# Patient Record
Sex: FEMALE | Race: WHITE | NOT HISPANIC OR LATINO | URBAN - METROPOLITAN AREA
[De-identification: names, ages, dates, MRNs, and addresses within clinical notes are randomized per-mention and may not be internally consistent; named-entity substitution may affect disease eponyms.]

---

## 2018-06-14 ENCOUNTER — EMERGENCY (EMERGENCY)
Facility: HOSPITAL | Age: 53
LOS: 1 days | End: 2018-06-14
Payer: COMMERCIAL

## 2018-06-14 PROCEDURE — 99284 EMERGENCY DEPT VISIT MOD MDM: CPT | Mod: 25

## 2019-10-08 ENCOUNTER — APPOINTMENT (OUTPATIENT)
Dept: MAMMOGRAPHY | Facility: CLINIC | Age: 54
End: 2019-10-08
Payer: COMMERCIAL

## 2019-10-08 PROCEDURE — 77067 SCR MAMMO BI INCL CAD: CPT

## 2019-10-08 PROCEDURE — 77063 BREAST TOMOSYNTHESIS BI: CPT

## 2019-10-17 ENCOUNTER — APPOINTMENT (OUTPATIENT)
Dept: MAMMOGRAPHY | Facility: CLINIC | Age: 54
End: 2019-10-17
Payer: COMMERCIAL

## 2019-10-17 PROCEDURE — 77065 DX MAMMO INCL CAD UNI: CPT | Mod: RT

## 2020-09-11 PROBLEM — Z00.00 ENCOUNTER FOR PREVENTIVE HEALTH EXAMINATION: Status: ACTIVE | Noted: 2020-09-11

## 2021-04-15 ENCOUNTER — APPOINTMENT (OUTPATIENT)
Dept: MAMMOGRAPHY | Facility: CLINIC | Age: 56
End: 2021-04-15
Payer: COMMERCIAL

## 2021-04-15 ENCOUNTER — APPOINTMENT (OUTPATIENT)
Dept: ULTRASOUND IMAGING | Facility: CLINIC | Age: 56
End: 2021-04-15

## 2021-04-15 PROCEDURE — 77066 DX MAMMO INCL CAD BI: CPT

## 2021-04-15 PROCEDURE — 76642 ULTRASOUND BREAST LIMITED: CPT | Mod: LT

## 2021-04-15 PROCEDURE — G0279: CPT

## 2021-04-22 ENCOUNTER — APPOINTMENT (OUTPATIENT)
Dept: ULTRASOUND IMAGING | Facility: CLINIC | Age: 56
End: 2021-04-22
Payer: COMMERCIAL

## 2021-04-22 PROCEDURE — 19083 BX BREAST 1ST LESION US IMAG: CPT | Mod: LT

## 2021-04-22 PROCEDURE — 77065 DX MAMMO INCL CAD UNI: CPT | Mod: LT

## 2021-04-27 ENCOUNTER — APPOINTMENT (OUTPATIENT)
Dept: BREAST CENTER | Facility: CLINIC | Age: 56
End: 2021-04-27
Payer: COMMERCIAL

## 2021-04-27 VITALS
SYSTOLIC BLOOD PRESSURE: 180 MMHG | HEART RATE: 67 BPM | BODY MASS INDEX: 29.02 KG/M2 | WEIGHT: 170 LBS | DIASTOLIC BLOOD PRESSURE: 110 MMHG | TEMPERATURE: 98.1 F | RESPIRATION RATE: 20 BRPM | OXYGEN SATURATION: 95 % | HEIGHT: 64 IN

## 2021-04-27 DIAGNOSIS — Z72.89 OTHER PROBLEMS RELATED TO LIFESTYLE: ICD-10-CM

## 2021-04-27 DIAGNOSIS — Z78.9 OTHER SPECIFIED HEALTH STATUS: ICD-10-CM

## 2021-04-27 PROCEDURE — 99072 ADDL SUPL MATRL&STAF TM PHE: CPT

## 2021-04-27 PROCEDURE — 99244 OFF/OP CNSLTJ NEW/EST MOD 40: CPT

## 2021-04-27 NOTE — ASSESSMENT
[FreeTextEntry1] : Pt is a 55 year old female referred for consultation by Dr. Rhodes for recent L U/S bx 4/22/21 showing IDC ER/NE/Her2 pending. Here with her , Marin. \par \par Pt states her left breast has always been lumpy but notice the left breast lump about a month ago. \par 10/8/19, Basia Crawford sMMG: TC 23.9%, R faint 6:00 calcs, rec for add imaging since prior mmg not available, BR0 ADDENDUM: outside mmg 2013 has been made avail for comparison and is of limited value, calcs not primarily observed, rec additional imaging, BR0\par 10/17/19, Yvette, ALTHEA dMMG: Benign appearing calcs, rec 6 mos f/u mmg for stability, BR3\par 4/15/21, Basia Crawford dMMG: TC 29.1%, het dense, L susp mass 12:00 kar to palpable lump, not well visualized d/t post location and adjacent implant, R negative, rec L u/s bx, BR4C\par 4/15/21, JUMANA Crawford U/S: approx 11/12:00 15mm het mass w/ irregular margins kar to palpable lump, bx rec, no abn lymph nodes in L axilla, BR4C\par 4/22/21, L U/S Bx, PATH: Invasive Ductal Carcinoma 11:00 5N, measures at least 1.5cm, -LVI, results are concordant, surg/onc mgmt rec\par \par Hx of bilat breast implants placed 2004. \par \par Fhx: Breast CA - mother 85, maunt-unknown ag at diagnosis. Not AJ.\par CBE: Right negative, Left 11-12:00 slightly palpable mass at superior edge of the implant. Bilat inframammary scars.  Implants riding high with some ptosis. No axillary or SC lymphadenopathy. \par Long discussion with patient regarding the biopsy results from [facility] from 04/22/2021  showing IDC of the L breast at the 11-12 o’clock, 10 cm FTN, Grade 2, Receptors pending, measuring 1.5 cm.  Reviewed extensively the options of mastectomy vs lumpectomy with the pros and cons for both.  \par With mastectomy, options of immediate or delayed reconstruction (expander/implant vs autologous flap) discussed and reconstruction handout given and contact for plastic surgeon. Also discussed usually no radiation with mastectomy but pending final pathology.  Discussed usually no reexcision with mastectomy. Also reviewed SLN biopsy (dye and radioactive tracer) and will check intraoperatively, if positive, may proceed with ALND. Discussed risks and complications including lymphedema and handout given out.  Also discussed if SLN is negative on frozen but positive on permanent, may need delay ALND. Reviewed will need drain(s) with mastectomy.\par \par With lumpectomy, will need radiation and treatment was reviewed.  Will need localization. Also discussed will need negative margins and if too close or positive, may need reexcision(s)/mastectomy.  JEEVAN  may not be an optimal oncoplastic surgery candidate but rec discussion with plastic surgeon. Reviewed SLNBx with patient and given Z-11, will not check axillary LN pathology intraoperatively if clinically not suspicious given radiation is to be given.  If suspicious, will check and pending results, may proceed to ALND.  \par Receptors are pending. Option of neoadj pending receptor results, if triple neg or [id number] positive.  Need for chemotherapy pending results of final pathology. May need additional tumor analysis such as oncotype Dx to determine need for chemotherapy.\par JEEVAN  was given information about reconstruction and name and contact for plastic surgeon.\par JEEVAN  met NCCN criteria for genetic testing and MYRIAD testing done today.\par She is clinical stage 1.\par She is leaning towards double mastectomy (right prophylactic), discussed is stage 1 at this time and this option is not necessary. She expressed understanding and will think about it further and see plastics. To get MRI also. \par \par

## 2021-04-27 NOTE — HISTORY OF PRESENT ILLNESS
[FreeTextEntry1] : Pt is a 55 year old female referred for consultation by Dr. Rhodes for recent L U/S bx 4/22/21 showing IDC ER/CO/Her2 pending. Here with her , Marin. \par \par Pt states her left breast has always been lumpy but notice the left breast lump about a month ago. \par 10/8/19, Basia Crawford sMMG: TC 23.9%, R faint 6:00 calcs, rec for add imaging since prior mmg not available, BR0 ADDENDUM: outside mmg 2013 has been made avail for comparison and is of limited value, calcs not primarily observed, rec additional imaging, BR0\par 10/17/19, ALTHEA Crawford dMMG: Benign appearing calcs, rec 6 mos f/u mmg for stability, BR3\par 4/15/21, Basia Crawford dMMG: TC 29.1%, het dense, L susp mass 12:00 kar to palpable lump, not well visualized d/t post location and adjacent implant, R negative, rec L u/s bx, BR4C\par 4/15/21, JUMANA Crawford U/S: approx 11/12:00 15mm het mass w/ irregular margins kar to palpable lump, bx rec, no abn lymph nodes in L axilla, BR4C\par 4/22/21, L U/S Bx, PATH: Invasive Ductal Carcinoma 11:00 5N, measures at least 1.5cm, -LVI, results are concordant, surg/onc mgmt rec\par \par Hx of bilat breast implants placed 2004. \par \par Fhx: Breast CA - mother 85, maunt-unknown ag at diagnosis. Not AJ.

## 2021-04-27 NOTE — DATA REVIEWED
[FreeTextEntry1] : 10/8/19, Basia Crawford sMMG: TC 23.9%, R faint 6:00 calcs, rec for add imaging since prior mmg not available, BR0 ADDENDUM: outside mmg 2013 has been made avail for comparison and is of limited value, calcs not primarily observed, rec additional imaging, BR0\par 10/17/19, ALTHEA Crawford dMMG: Benign appearing calcs, rec 6 mos f/u mmg for stability, BR3\par 4/15/21, Basia Crawford dMMG: TC 29.1%, het dense, L susp mass 12:00 kar to palpable lump, not well visualized d/t post location and adjacent implant, R negative, rec L u/s bx, BR4C\par 4/15/21, JUMANA Crawford U/S: approx 11/12:00 15mm het mass w/ irregular margins kar to palpable lump, bx rec, no abn lymph nodes in L axilla, BR4C\par 4/22/21, L U/S Bx, PATH: Invasive Ductal Carcinoma 11:00 5N, measures at least 1.5cm, -LVI, results are concordant, surg/onc mgmt rec

## 2021-04-27 NOTE — CONSULT LETTER
[Dear  ___] : Dear  [unfilled], [Consult Letter:] : I had the pleasure of evaluating your patient, [unfilled]. [Please see my note below.] : Please see my note below. [Consult Closing:] : Thank you very much for allowing me to participate in the care of this patient.  If you have any questions, please do not hesitate to contact me. [FreeTextEntry3] : Tegan Staley MD

## 2021-04-27 NOTE — PHYSICAL EXAM
[Normocephalic] : normocephalic [Atraumatic] : atraumatic [Supple] : supple [No Supraclavicular Adenopathy] : no supraclavicular adenopathy [No Thyromegaly] : no thyromegaly [Examined in the supine and seated position] : examined in the supine and seated position [Bra Size: ___] : Bra Size: [unfilled] [No dominant masses] : no dominant masses in right breast  [No Nipple Retraction] : no left nipple retraction [No Nipple Discharge] : no left nipple discharge [No Axillary Lymphadenopathy] : no left axillary lymphadenopathy [No Edema] : no edema [No Swelling] : no swelling [Full ROM] : full range of motion [No Rashes] : no rashes [No Ulceration] : no ulceration [de-identified] : Bilat implants, partial Subpect, riding high on breast with some ptosis. Bilat inframammary scars. [de-identified] : Slightly palpable 1.5 cm mass at superior edge of implant 11-12:00

## 2021-04-27 NOTE — PAST MEDICAL HISTORY
[Menarche Age ____] : age at menarche was [unfilled] [Menopause Age____] : age at menopause was [unfilled] [Total Preg ___] : G[unfilled] [Live Births ___] : P[unfilled]  [Age At Live Birth ___] : Age at live birth: [unfilled] [History of Hormone Replacement Treatment] : has no history of hormone replacement treatment [Living ___] : Living: [unfilled] [FreeTextEntry7] : Yes [FreeTextEntry8] : Yes, not long

## 2021-04-28 ENCOUNTER — APPOINTMENT (OUTPATIENT)
Dept: PLASTIC SURGERY | Facility: CLINIC | Age: 56
End: 2021-04-28
Payer: COMMERCIAL

## 2021-04-28 VITALS
HEIGHT: 63 IN | HEART RATE: 68 BPM | TEMPERATURE: 98.9 F | SYSTOLIC BLOOD PRESSURE: 152 MMHG | RESPIRATION RATE: 16 BRPM | BODY MASS INDEX: 30.12 KG/M2 | WEIGHT: 170 LBS | DIASTOLIC BLOOD PRESSURE: 82 MMHG | OXYGEN SATURATION: 98 %

## 2021-04-28 DIAGNOSIS — Z87.891 PERSONAL HISTORY OF NICOTINE DEPENDENCE: ICD-10-CM

## 2021-04-28 DIAGNOSIS — Z82.49 FAMILY HISTORY OF ISCHEMIC HEART DISEASE AND OTHER DISEASES OF THE CIRCULATORY SYSTEM: ICD-10-CM

## 2021-04-28 PROCEDURE — 99072 ADDL SUPL MATRL&STAF TM PHE: CPT

## 2021-04-28 PROCEDURE — 99245 OFF/OP CONSLTJ NEW/EST HI 55: CPT

## 2021-04-28 NOTE — REASON FOR VISIT
[Consultation] : a consultation visit [Spouse] : spouse [FreeTextEntry1] : consultation for reconstruction.  Dx: LT breast IDC.  Current bra size: 38C.  Here with , Marin.

## 2021-04-28 NOTE — CONSULT LETTER
[Dear  ___] : Dear  [unfilled], [Consult Letter:] : I had the pleasure of evaluating your patient, [unfilled]. [Please see my note below.] : Please see my note below. [Consult Closing:] : Thank you very much for allowing me to participate in the care of this patient.  If you have any questions, please do not hesitate to contact me. [Sincerely,] : Sincerely, [FreeTextEntry3] : Nancy Herrera MD

## 2021-04-28 NOTE — REVIEW OF SYSTEMS
[Negative] : Musculoskeletal [de-identified] : Bruising at biopsy site. [de-identified] : Upset about new cancer diagnosis.

## 2021-04-28 NOTE — PHYSICAL EXAM
[NI] : Normal [Bra Size: _______] : Bra Size: [unfilled] [de-identified] : Pt anxious and tearful. [de-identified] : NL respiratory effort noted  [de-identified] : Two parallel Pfannenstiel scars, very fine line. No palpable hernia.\par Mild to moderate excess adipose and mild excess skin. Not adequate to replace current breast plus implant volume. [de-identified] : Bilateral breasts with partial submuscular implant augmentation (likely dual plane).\par Bilateral capsular contracture and breasts with waterfall deformity.\par Right breast with additional medial deformity of IMF soft tissue and related superior tethering of the IMF scar.\par Left breast with ecchymosis at UOQ 1 o'clock.\par Mild animation deformity with increased pull of tethered scar on the right. [de-identified] : Tearful and depressed mood regarding diagnosis.

## 2021-04-28 NOTE — HISTORY OF PRESENT ILLNESS
[FreeTextEntry1] : This is a 56 yo woman with a history of bilateral breast augmentation and revision augmentation who presents now with left breast cancer. Pt reports she found a lump on the left side that seemed a little different than the lumpy tissue she reports she has always had.\par She reports her first augmentation was in 2004 and she believes the implants were saline. She then had a revision in 2006. She is not sure why the revision was done, maybe for some scar tissue, but she believes the implants were changed to gel implants.\par She is not sure at this point whether she will choose lumpectomy or mastectomy, but she does feel that she would choose to have bilateral mastectomy rather than unilateral.

## 2021-04-28 NOTE — ASSESSMENT
[FreeTextEntry1] : The options of lumpectomy vs mastectomy are being considered, and pt is here for consultation regarding breast reconstruction.\par We discussed the difficulty of achieving a single stage reconstruction regardless of whether pt has lumpectomy or mastectomy, due to the waterfall and tethering deformities of the breasts currently. She does not want to consider the options of no reconstruction or implant removal without replacement during a first stage.\par We discussed the spectrum of reconstructive options after mastectomy, including no reconstruction, implant based reconstruction and autologous reconstruction. We briefly discussed the risk/benefit ratio for each of these options.\par We discussed the R/B/A of implant based reconstruction, including the likelihood of a a two-stage reconstruction. \par She and I discussed abdominally based autologous reconstruction, but she strongly opposed the idea of "moving fat around". She was not clear why she felt this way, but the concern seems to stem from the idea that weight loss or gain in the future would distort that fat in the new location.\par She did seem more open to the prospect of fat grating than flap surgery as this would be needed with implant based reconstruction as well.\par She is awaiting results of further testing and follow up with Dr. Staley to determine whether she will pursue lumpectomy or mastectomy.\par A follow up visit here is scheduled in two weeks to finalize a plan.

## 2021-04-29 ENCOUNTER — NON-APPOINTMENT (OUTPATIENT)
Age: 56
End: 2021-04-29

## 2021-04-30 ENCOUNTER — RESULT REVIEW (OUTPATIENT)
Age: 56
End: 2021-04-30

## 2021-04-30 ENCOUNTER — NON-APPOINTMENT (OUTPATIENT)
Age: 56
End: 2021-04-30

## 2021-04-30 ENCOUNTER — APPOINTMENT (OUTPATIENT)
Dept: MRI IMAGING | Facility: CLINIC | Age: 56
End: 2021-04-30
Payer: COMMERCIAL

## 2021-04-30 PROCEDURE — A9585: CPT | Mod: JW

## 2021-04-30 PROCEDURE — A9585C: CUSTOM

## 2021-05-04 ENCOUNTER — NON-APPOINTMENT (OUTPATIENT)
Age: 56
End: 2021-05-04

## 2021-05-05 ENCOUNTER — NON-APPOINTMENT (OUTPATIENT)
Age: 56
End: 2021-05-05

## 2021-05-06 ENCOUNTER — APPOINTMENT (OUTPATIENT)
Dept: BREAST CENTER | Facility: CLINIC | Age: 56
End: 2021-05-06
Payer: COMMERCIAL

## 2021-05-06 VITALS
HEART RATE: 75 BPM | BODY MASS INDEX: 30.12 KG/M2 | SYSTOLIC BLOOD PRESSURE: 158 MMHG | TEMPERATURE: 97.3 F | HEIGHT: 63 IN | WEIGHT: 170 LBS | DIASTOLIC BLOOD PRESSURE: 90 MMHG

## 2021-05-06 PROCEDURE — 99215 OFFICE O/P EST HI 40 MIN: CPT

## 2021-05-06 PROCEDURE — 99072 ADDL SUPL MATRL&STAF TM PHE: CPT

## 2021-05-06 NOTE — PAST MEDICAL HISTORY
[Menarche Age ____] : age at menarche was [unfilled] [Menopause Age____] : age at menopause was [unfilled] [Total Preg ___] : G[unfilled] [Live Births ___] : P[unfilled]  [Living ___] : Living: [unfilled] [Age At Live Birth ___] : Age at live birth: [unfilled] [History of Hormone Replacement Treatment] : has no history of hormone replacement treatment [FreeTextEntry7] : Yes [FreeTextEntry8] : Yes, not long

## 2021-05-06 NOTE — PHYSICAL EXAM
[Normocephalic] : normocephalic [Atraumatic] : atraumatic [Supple] : supple [No Supraclavicular Adenopathy] : no supraclavicular adenopathy [No Thyromegaly] : no thyromegaly [Examined in the supine and seated position] : examined in the supine and seated position [Bra Size: ___] : Bra Size: [unfilled] [No dominant masses] : no dominant masses in right breast  [No dominant masses] : no dominant masses left breast [No Nipple Retraction] : no left nipple retraction [No Nipple Discharge] : no left nipple discharge [No Axillary Lymphadenopathy] : no left axillary lymphadenopathy [No Edema] : no edema [No Swelling] : no swelling [Full ROM] : full range of motion [No Rashes] : no rashes [No Ulceration] : no ulceration [de-identified] : Bilat implants, partial Subpect, riding high on breast with some ptosis. Bilat inframammary scars.

## 2021-05-06 NOTE — HISTORY OF PRESENT ILLNESS
[FreeTextEntry1] : Pt is a 55 year old female here for follow up regarding further treatment planning and review of MRI for recent L U/S bx 4/22/21 showing IDC, ER/AK status + >95%, Her2 equivocal, CISH negative.\par referred by Dr. Rhodes \par Here with her , Marin. \par \par Pt states her left breast has always been lumpy but notice the left breast lump about a month ago. \par 10/8/19, Basia Crawford sMMG: TC 23.9%, R faint 6:00 calcs, rec for add imaging since prior mmg not available, BR0 ADDENDUM: outside mmg 2013 has been made avail for comparison and is of limited value, calcs not primarily observed, rec additional imaging, BR0\par 10/17/19, ALTHEA Crawford dMMG: Benign appearing calcs, rec 6 mos f/u mmg for stability, BR3\par 4/15/21, Basia Crawford dMMG: TC 29.1%, het dense, L susp mass 12:00 kar to palpable lump, not well visualized d/t post location and adjacent implant, R negative, rec L u/s bx, BR4C\par 4/15/21, JUMANA Crawford U/S: approx 11/12:00 15mm het mass w/ irregular margins kar to palpable lump, bx rec, no abn lymph nodes in L axilla, BR4C\par 4/22/21, L U/S Bx, PATH: Invasive Ductal Carcinoma 11:00 5N, measures at least 1.5cm, -LVI, results are concordant, surg/onc mgmt rec\par 4/30/21, [facility], MRI: R scattered nonspecific enhancing foci, implant intact, L scattered nonspecific enhancing foci, irregularly enhancing mass 11/12:00 position w/ assoc tissue marker, rep known malignancy, 4.2cm sup to inferior, 3.7cm ant to post, close proximity to pectoralis muscle w/o evidence of chest wall invasion, enhancement extends either to the ant margin of the pec muscle or perhaps within 1-2mm of the muscle, 3.1cm left to right dimension, more inferiorly lateral well-circumscribed T2 hyperintense mass w/ a nonenhancing septation likely rep FA approx 10mm not sig changed from mmg from 2013, asymmetry of implant when comp to R with fallaway sign rep intracapsular rupture, no sig axillary or internal mammary lymphadenopathy, no evidence of multifocal or multicentric dx, rec surg/onc mgmt, BR6.\par \par Hx of bilat breast implants placed 2004. \par \par Fhx: Breast CA - mother 85, maunt-unknown ag at diagnosis. Not AJ. \par 4/27/21 CBE: Right neg, Left 1.5 cm mass. No adenopathy. \par \par

## 2021-05-06 NOTE — ASSESSMENT
[FreeTextEntry1] : Pt is a 55 year old female here for follow up regarding further treatment planning and review of MRI for recent L U/S bx 4/22/21 showing IDC, ER/OK status + >95%, Her2 equivocal, CISH negative.\par referred by Dr. Rhdoes \par Here with her , Marin. \par PCP is Bethany Wright MD.\par Pt states her left breast has always been lumpy but notice the left breast lump about a month ago. \par 10/8/19, Basia Crawford sMMG: TC 23.9%, R faint 6:00 calcs, rec for add imaging since prior mmg not available, BR0 ADDENDUM: outside mmg 2013 has been made avail for comparison and is of limited value, calcs not primarily observed, rec additional imaging, BR0\par 10/17/19, ALTHEA Crawford dMMG: Benign appearing calcs, rec 6 mos f/u mmg for stability, BR3\par 4/15/21, Basia Crawford dMMG: TC 29.1%, het dense, L susp mass 12:00 kar to palpable lump, not well visualized d/t post location and adjacent implant, R negative, rec L u/s bx, BR4C\par 4/15/21, JUMANA Crawford U/S: approx 11/12:00 15mm het mass w/ irregular margins kar to palpable lump, bx rec, no abn lymph nodes in L axilla, BR4C\par 4/22/21, L U/S Bx, PATH: Invasive Ductal Carcinoma 11:00 5N, measures at least 1.5cm, -LVI, results are concordant, surg/onc mgmt rec\par 4/30/21, [facility], MRI: R scattered nonspecific enhancing foci, implant intact, L scattered nonspecific enhancing foci, irregularly enhancing mass 11/12:00 position w/ assoc tissue marker, rep known malignancy, 4.2cm sup to inferior, 3.7cm ant to post, close proximity to pectoralis muscle w/o evidence of chest wall invasion, enhancement extends either to the ant margin of the pec muscle or perhaps within 1-2mm of the muscle, 3.1cm left to right dimension, more inferiorly lateral well-circumscribed T2 hyperintense mass w/ a nonenhancing septation likely rep FA approx 10mm not sig changed from mmg from 2013, asymmetry of implant when comp to R with fallaway sign rep intracapsular rupture, no sig axillary or internal mammary lymphadenopathy, no evidence of multifocal or multicentric dx, rec surg/onc mgmt, BR6.\par \par Hx of bilat breast implants placed 2004. \par \par Fhx: Breast CA - mother 85, maunt-unknown ag at diagnosis. Not AJ. \par 4/27/21 CBE: Right neg, Left 1.5 cm mass. No adenopathy. \par Pt was leaning towards mastectomy at last visit, genetic test results still pending. She would need revision on the right for symmetry.  \par Pt saw Dr. Herrera and has appt with her on weds.  Reviewed studies and results of MRI, lesion on MRI is larger than on mmg and CBE.  4.2 cm Superior/Inferior to 3.7 cm AP, 3.1 cm CC. No evidence of adenopathy. RIGHT negative, Also intracapsular rupture of left implant.\par If pt wants lumpectomy, could biopsy the extreme end of abnormality to see if still a candidate for BCS. Also neoadj option discussed but tumor is ER/OK 95% and [id number] neg. Would do oncotype prior to initiation of chemo. \par  On CBE and mmg and u/s, it appears to be 1.5 cm and discussed oversensitivity of MRI. \par  Pt wondering about need for chemo, discussed will be dependent on LN positivity and/or oncotype.  Stressed the the type of surgery is independent of need for chemo.  Radiation with mastectomy unlikely but dependent on size, margins and/or LN status.\par Pt wondering about Right prophylactic. Discussed no evidence to support prolongation of life with it, risk reduction is not 100% with mastectomies.  Would be done for symmetry, pt's choice.  Also would not have to wait for results of genetic testing if she has double mastectomy.  Would not do SLBbx on right if prophylactic but if any malignancy found incidentally on right, may need additional axillary surgery or treatment.  Also rediscussed SLBbx on left and possible ALND. Risk of lymphedema also reviewed. \par Reviewed recovery, drains, postop course.  \par Pt declines neoadj option or rebiopsy would like to schedule double mastectomy at this time with implant/expander recon for 5/21/21.  She does not want MEENU. \par

## 2021-05-07 ENCOUNTER — NON-APPOINTMENT (OUTPATIENT)
Age: 56
End: 2021-05-07

## 2021-05-12 ENCOUNTER — NON-APPOINTMENT (OUTPATIENT)
Age: 56
End: 2021-05-12

## 2021-05-12 ENCOUNTER — APPOINTMENT (OUTPATIENT)
Dept: PLASTIC SURGERY | Facility: CLINIC | Age: 56
End: 2021-05-12
Payer: COMMERCIAL

## 2021-05-12 VITALS
HEART RATE: 80 BPM | RESPIRATION RATE: 16 BRPM | BODY MASS INDEX: 29.95 KG/M2 | OXYGEN SATURATION: 98 % | HEIGHT: 63 IN | DIASTOLIC BLOOD PRESSURE: 84 MMHG | WEIGHT: 169 LBS | SYSTOLIC BLOOD PRESSURE: 152 MMHG | TEMPERATURE: 98.8 F

## 2021-05-12 DIAGNOSIS — Z42.1 ENCOUNTER FOR BREAST RECONSTRUCTION FOLLOWING MASTECTOMY: ICD-10-CM

## 2021-05-12 DIAGNOSIS — T85.42XD: ICD-10-CM

## 2021-05-12 DIAGNOSIS — Z98.82 BREAST IMPLANT STATUS: ICD-10-CM

## 2021-05-12 PROCEDURE — 99072 ADDL SUPL MATRL&STAF TM PHE: CPT

## 2021-05-12 PROCEDURE — 99215 OFFICE O/P EST HI 40 MIN: CPT

## 2021-05-12 PROCEDURE — 99417 PROLNG OP E/M EACH 15 MIN: CPT

## 2021-05-12 NOTE — PHYSICAL EXAM
[NI] : Normal [de-identified] : occ tearful. Alert and oriented x 3. [de-identified] : NL respiratory effort noted  [de-identified] : Bilateral breast examined.\par No change from previous.

## 2021-05-12 NOTE — HISTORY OF PRESENT ILLNESS
[FreeTextEntry1] : Pt returns to discuss breast reconstruction options, now that she has more results of her testing.\par She has spoken with Dr. Staley and has decided on left mastectomy, and is leaning toward right prophylactic mastectomy as well.\par She has multiple questions about the reconstruction process.

## 2021-05-12 NOTE — REASON FOR VISIT
[Follow-Up: _____] : a [unfilled] follow-up visit [FreeTextEntry1] : discuss reconstruction options.  Recent dx of LT breast IDC

## 2021-05-12 NOTE — ASSESSMENT
[FreeTextEntry1] : Following our discussion, pt confirms that she wishes to have bilateral mastectomies and implant based breast reconstruction.\par We reviewed the spectrum of reconstructive options, including no reconstruction, implant based reconstruction and autologous reconstruction. We discussed the risk/benefit ratio for each of these options.\par She and I discussed at length the R/B/A of implant based reconstruction, including the need for a two-stage reconstruction, including bilateral capsulectomies and implant removal. We discussed the process of tissue expander fills over time and that the Douglas would be filled in the OR to the limits guided by the overlying skin viability.\par We also reviewed the potential for skin flap necrosis necessitating revision or TE removal.  We discussed that implants are not considered lifetime devices, and may need to be replaced in the future. We discussed the risks of infection requiring implant removal, rupture, capsular contracture and implant exposure (especially following radiation).\par We reviewed drain care and she was able to return demonstrate the technique times two.\par We also reviewed pain management. She was worried about pain and emphasized the need for a narcotic prescription. I went over the effectiveness of the non-narcotic regimen, but that oxycodone would also be given for breakthrough. She is tentatively scheduled for 5/21/21. Will discuss with Dr. Staley.

## 2021-05-13 PROBLEM — T85.42XD: Status: ACTIVE | Noted: 2021-05-13

## 2021-05-13 PROBLEM — Z98.82 BREAST IMPLANT STATUS: Status: ACTIVE | Noted: 2021-05-13

## 2021-05-14 ENCOUNTER — OUTPATIENT (OUTPATIENT)
Dept: OUTPATIENT SERVICES | Facility: HOSPITAL | Age: 56
LOS: 1 days | End: 2021-05-14
Payer: COMMERCIAL

## 2021-05-14 PROCEDURE — 93010 ELECTROCARDIOGRAM REPORT: CPT

## 2021-05-14 PROCEDURE — 71046 X-RAY EXAM CHEST 2 VIEWS: CPT | Mod: 26

## 2021-05-17 ENCOUNTER — NON-APPOINTMENT (OUTPATIENT)
Age: 56
End: 2021-05-17

## 2021-05-18 ENCOUNTER — APPOINTMENT (OUTPATIENT)
Dept: DISASTER EMERGENCY | Facility: CLINIC | Age: 56
End: 2021-05-18

## 2021-05-18 ENCOUNTER — OUTPATIENT (OUTPATIENT)
Dept: OUTPATIENT SERVICES | Facility: HOSPITAL | Age: 56
LOS: 1 days | End: 2021-05-18

## 2021-05-19 LAB — SARS-COV-2 N GENE NPH QL NAA+PROBE: NOT DETECTED

## 2021-05-21 ENCOUNTER — INPATIENT (INPATIENT)
Facility: HOSPITAL | Age: 56
LOS: 0 days | Discharge: HOME | End: 2021-05-22
Attending: SURGERY | Admitting: SURGERY
Payer: COMMERCIAL

## 2021-05-21 ENCOUNTER — APPOINTMENT (OUTPATIENT)
Dept: PLASTIC SURGERY | Facility: HOSPITAL | Age: 56
End: 2021-05-21
Payer: COMMERCIAL

## 2021-05-21 ENCOUNTER — APPOINTMENT (OUTPATIENT)
Dept: BREAST CENTER | Facility: HOSPITAL | Age: 56
End: 2021-05-21

## 2021-05-21 PROCEDURE — 38792 RA TRACER ID OF SENTINL NODE: CPT | Mod: 59

## 2021-05-21 PROCEDURE — 38525 BIOPSY/REMOVAL LYMPH NODES: CPT

## 2021-05-21 PROCEDURE — 19371 PERI-IMPLT CAPSLC BRST COMPL: CPT | Mod: RT

## 2021-05-21 PROCEDURE — 19357 TISS XPNDR PLMT BRST RCNSTJ: CPT | Mod: RT

## 2021-05-21 PROCEDURE — 19303 MAST SIMPLE COMPLETE: CPT | Mod: 50

## 2021-05-21 PROCEDURE — 15777 ACELLULAR DERM MATRIX IMPLT: CPT | Mod: LT

## 2021-05-21 PROCEDURE — 38900 IO MAP OF SENT LYMPH NODE: CPT

## 2021-05-23 ENCOUNTER — NON-APPOINTMENT (OUTPATIENT)
Age: 56
End: 2021-05-23

## 2021-05-24 ENCOUNTER — NON-APPOINTMENT (OUTPATIENT)
Age: 56
End: 2021-05-24

## 2021-05-26 ENCOUNTER — APPOINTMENT (OUTPATIENT)
Dept: PLASTIC SURGERY | Facility: CLINIC | Age: 56
End: 2021-05-26
Payer: COMMERCIAL

## 2021-05-26 VITALS
RESPIRATION RATE: 16 BRPM | OXYGEN SATURATION: 99 % | SYSTOLIC BLOOD PRESSURE: 120 MMHG | WEIGHT: 166 LBS | DIASTOLIC BLOOD PRESSURE: 80 MMHG | BODY MASS INDEX: 29.41 KG/M2 | HEART RATE: 76 BPM | HEIGHT: 63 IN

## 2021-05-26 PROCEDURE — 99024 POSTOP FOLLOW-UP VISIT: CPT

## 2021-05-26 NOTE — REASON FOR VISIT
[Post Op: _________] : a [unfilled] post op visit [FreeTextEntry1] : pt had mastectomy done, is following up on how she is doing \par states she has no complaints

## 2021-05-26 NOTE — PHYSICAL EXAM
[de-identified] : NL respiratory effort noted\par  [de-identified] : bilateral Douglas in good position.\par Right chest skin with some ischemic skin in the IMF at the medial corner, but this has cap refill.\par Left chest wall skin with some isosulfan blue remaining in the inferior aspect, medial and lateral to the T junction, but much less than POD#1 in the hospital. The total area of concern is about 3cm in diameter.\par Hyperemia noted on both sides, but limited to the areas of DMSO application.\par drain dressings changed.\par Drains serosanguinous.

## 2021-05-26 NOTE — HISTORY OF PRESENT ILLNESS
[FreeTextEntry1] : Pt reports she is not in much pain, just finding it hard to look at her chest following the mastectomies.\par Also asking when she can start upper body workouts/weights again.\par  is applying DMSO gel as instructed.\par Drains 80 on the left and 45 on the right as of yesterday. Total amounts are coming down.

## 2021-05-26 NOTE — ASSESSMENT
[FreeTextEntry1] : Stable postop day 5.\par DMSO is improving the circulation to the ischemic mastectomy flaps.  instructed to apply it to a smaller area now on each side.\par Follow up weekly for drain removal (and TE fill once skin is ready).

## 2021-05-28 ENCOUNTER — NON-APPOINTMENT (OUTPATIENT)
Age: 56
End: 2021-05-28

## 2021-06-01 ENCOUNTER — NON-APPOINTMENT (OUTPATIENT)
Age: 56
End: 2021-06-01

## 2021-06-02 ENCOUNTER — APPOINTMENT (OUTPATIENT)
Dept: PLASTIC SURGERY | Facility: CLINIC | Age: 56
End: 2021-06-02
Payer: COMMERCIAL

## 2021-06-02 VITALS
BODY MASS INDEX: 28.1 KG/M2 | HEIGHT: 63 IN | OXYGEN SATURATION: 98 % | TEMPERATURE: 98.3 F | SYSTOLIC BLOOD PRESSURE: 152 MMHG | HEART RATE: 71 BPM | WEIGHT: 158.6 LBS | RESPIRATION RATE: 16 BRPM | DIASTOLIC BLOOD PRESSURE: 86 MMHG

## 2021-06-02 PROCEDURE — 99024 POSTOP FOLLOW-UP VISIT: CPT

## 2021-06-02 NOTE — REASON FOR VISIT
[Post Op: _________] : a [unfilled] post op visit [Spouse] : spouse [FreeTextEntry1] : s/p B/L breast capsulectomy with implant removal / immediate B/L reconstruction with TE implantation done 5/21/21.  Accompanied by , Marin.

## 2021-06-02 NOTE — ASSESSMENT
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 She was also unsure whether this meant the cancer had spread. I also reassured her that this was not the case and that she also had negative sentinel lymph node biopsies that indicate that the tumor had not spread to the lymph nodes. She stated she felt relieved after this explanation. \par Follow up in one week for left drain removal and wound check.

## 2021-06-02 NOTE — SURGICAL HISTORY
[de-identified] : 05/21/2021 - B/L breast capsulectomy with implant removal ;  immediate B/L reconstruction with TE done at [facility] [place] by [place]. Nancy Herrera

## 2021-06-02 NOTE — PHYSICAL EXAM
[de-identified] : Anxious, tearful [de-identified] : Right chest wall healing well. TE in good position. Steristrips in place. Good cap refill throughout.\par Left chest wall: blue dye has cleared. There is a 1.5cm area of tissue necrosis in the inferior aspect on the medial limb. There is a smaller area of ischemia on the corner of the lateral limb. TE is in place, but fluid is tending to collect in the superior pole of the TE rather than inferior.\par No erythema or induration.\par Right drain removed without difficulty and pt tolerated it well.\par Left drain stripped. Contents are serosanguinous.

## 2021-06-02 NOTE — HISTORY OF PRESENT ILLNESS
[FreeTextEntry1] : Pt reports she is feeling fine physically, but upset because she said she "still has cancer" in her.\par She reports she was informed of the positive anterior margin on pathology and that she will need more treatment pending oncotype and RT/onc consultations.\par She is also scared that one of the drains may be removed and "that's going to hurt". When asked why she thought that, she referred to YouTube videos "that I probably shouldn't be watching".\par Right drain about 30ml /day for several days.\par Left drain 45-50/day.\par \par She is here with her .\par

## 2021-06-03 ENCOUNTER — NON-APPOINTMENT (OUTPATIENT)
Age: 56
End: 2021-06-03

## 2021-06-03 ENCOUNTER — APPOINTMENT (OUTPATIENT)
Dept: BREAST CENTER | Facility: CLINIC | Age: 56
End: 2021-06-03
Payer: COMMERCIAL

## 2021-06-03 VITALS
BODY MASS INDEX: 28 KG/M2 | WEIGHT: 158 LBS | HEART RATE: 72 BPM | HEIGHT: 63 IN | TEMPERATURE: 97.3 F | DIASTOLIC BLOOD PRESSURE: 85 MMHG | SYSTOLIC BLOOD PRESSURE: 147 MMHG

## 2021-06-03 PROCEDURE — 99024 POSTOP FOLLOW-UP VISIT: CPT

## 2021-06-03 NOTE — HISTORY OF PRESENT ILLNESS
[FreeTextEntry1] : Pt is a 55 year old female here post op 5/21/21 R prophylactic and L mastectomy w/ SLNbx and bilat recon w/ TE per Dr. Herrera for recent L U/S bx 4/22/21 showing IDC, ER/MA status + >95%, Her2, CISH negative.\par \par 5/21/21 Surgical PATH: R negative, L IDC mod diff, anterior margin + invasive ca, posterior margin 1.0mm from invasive ca, pT2N0/1Mx, measures 2.5x1.8x1.5cm, Grade 2, -DCIS, -LCIS, -LVI, focal ALH, ER+ >95%, MA+ >95%, Her2 CISH-\par Referred by Dr. Rhodes \par Here with her , Mairn. \par PCP is Bethany Wright MD.\par \par Hx of bilat breast implants placed 2004. \par \par Fhx: Breast CA - mother 85, maunt-unknown ag at diagnosis. Not AJ. \par 4/27/21 CBE: Right neg, Left 1.5 cm mass. No adenopathy. \par Pt doing ok physically but emotionally upset. Has been reading a lot on the internet and wondering if it has spread to her SC LN.

## 2021-06-03 NOTE — DATA REVIEWED
[FreeTextEntry1] : 5/21/21 Surgical PATH: R negative, L IDC mod diff, anterior margin + invasive ca, posterior margin 1.0mm from invasive ca, pT2N0/1Mx, measures 2.5x1.8x1.5cm, Grade 2, -DCIS, -LCIS, LVI - scattered foci susp for lymphovascular invasion, focal ALH, FCC w/ apocrine metaplasia, ER+ >95%, NE+ >95%, [id number]+, CISH-

## 2021-06-03 NOTE — PHYSICAL EXAM
[No Supraclavicular Adenopathy] : no supraclavicular adenopathy [No Axillary Lymphadenopathy] : no left axillary lymphadenopathy [No Edema] : no edema [No Swelling] : no swelling [Full ROM] : full range of motion [No Rashes] : no rashes [No Ulceration] : no ulceration [de-identified] : S/p mastectomy bilat, Bilat inverted T incisions w/ steri strips; R breast drain taken out 6/1 by JUMANA Herrera breast drain still in place draining serosanguinous fluid. No evidence of hematoma or cellulitis.  NO left supraclavicular lymphadenopathy.

## 2021-06-03 NOTE — ASSESSMENT
[FreeTextEntry1] : Pt is a 55 year old female here post op 5/21/21 R prophylactic and L mastectomy w/ SLNbx and bilat recon w/ TE per Dr. Herrera for recent L U/S bx 4/22/21 showing IDC, ER/OK status + >95%, Her2 equivocal, CISH negative.\par \par 5/21/21 Surgical PATH: R negative, L IDC mod diff, anterior margin + invasive ca, posterior margin 1.0mm from invasive ca, pT2N0/1Mx, measures 2.5x1.8x1.5cm, Grade 2, -DCIS, -LCIS, - LVI, focal ALH, FCC w/ apocrine metaplasia, ER+ >95%, OK+ >95%, Her2 CISH-\par Referred by Dr. Rhodes \par Here with her , Marin. \par PCP is Bethany Wright MD.\par \par Hx of bilat breast implants placed 2004. \par \par Fhx: Breast CA - mother 85, maunt-unknown ag at diagnosis. Not AJ. \par 4/27/21 CBE: Right neg, Left 1.5 cm mass. No adenopathy. \par CBE: Inverted T inc after bilat mastectomy, with SADE in left. Healing well. No left SC lymphadenopathy. Full ROM and no LE.\par Reviewed path with pt and , oncotype pending . Positive anterior margins, may need radiation. LN negative. Pt reassured no evidence of SC lymphadenopathy. Rec appt with rad onc and also med onc (once oncotype is back). Advised to stop reading on the internet as it is causing her a lot distress.\par F.u 4 weeks. Appt with Dr. Herrera as planned. \par

## 2021-06-03 NOTE — CONSULT LETTER
[Dear  ___] : Dear  [unfilled], [Courtesy Letter:] : I had the pleasure of seeing your patient, [unfilled], in my office today. [Please see my note below.] : Please see my note below. [Sincerely,] : Sincerely, [FreeTextEntry3] : Tegan Staley MD

## 2021-06-09 ENCOUNTER — APPOINTMENT (OUTPATIENT)
Dept: PLASTIC SURGERY | Facility: CLINIC | Age: 56
End: 2021-06-09
Payer: COMMERCIAL

## 2021-06-09 VITALS
HEART RATE: 76 BPM | BODY MASS INDEX: 27.82 KG/M2 | HEIGHT: 63 IN | OXYGEN SATURATION: 98 % | SYSTOLIC BLOOD PRESSURE: 142 MMHG | DIASTOLIC BLOOD PRESSURE: 84 MMHG | RESPIRATION RATE: 16 BRPM | TEMPERATURE: 98 F | WEIGHT: 157 LBS

## 2021-06-09 DIAGNOSIS — S21.102A UNSPECIFIED OPEN WOUND OF LEFT FRONT WALL OF THORAX W/OUT PENETRATION INTO THORACIC CAVITY, INITIAL ENCOUNTER: ICD-10-CM

## 2021-06-09 PROCEDURE — 99024 POSTOP FOLLOW-UP VISIT: CPT

## 2021-06-09 RX ORDER — MULTIVIT-MIN/VIT C/HERB NO.124 250-11.66
TABLET,CHEWABLE ORAL
Refills: 0 | Status: DISCONTINUED | COMMUNITY
End: 2021-06-09

## 2021-06-09 NOTE — HISTORY OF PRESENT ILLNESS
[FreeTextEntry1] : Pt reports the drain output has been 35-35-30 over the last three days.\par the drain site is really irritating her.\par Otherwise feeling ok.\par

## 2021-06-09 NOTE — PHYSICAL EXAM
[NI] : Normal [de-identified] : NL respiratory effort noted  [de-identified] : Bilateral Douglas in good position.\par All steristrips removed.\par Right side is healing well.\par Left drain removed with out problem (serous fluid).\par Left inferior pole skin necrosis excised with scissors as the edges were starting to separate.\par Healthy, viable autoderm flap visible at base. Some necrotic SQ fat seen at the periphery of the wound.\par No erythema, edema or induration.\par The the demarcated area was about 1.5 cm in diameter.

## 2021-06-09 NOTE — ASSESSMENT
[FreeTextEntry1] : Pt stable postop.\par Left mastectomy flap necrosis without evidence of expander infection.\par Will start local wound care and monitor weekly. Instructed pt on abx ointment on a gauze pad daily or more often as needed, tuck into bra ( camisole with shelf bra etc) on the left.\par Will discuss wound and expansion schedule with Dr. Kelley of radiation oncology.\par follow up one week.

## 2021-06-09 NOTE — REASON FOR VISIT
[Post Op: _________] : a [unfilled] post op visit [FreeTextEntry1] : s/p B/L breast capsulectomy with impant removal / immediate B/L reconstruction with TE implantation done 5/21/21.  Patient states she is feeling well today.

## 2021-06-12 ENCOUNTER — NON-APPOINTMENT (OUTPATIENT)
Age: 56
End: 2021-06-12

## 2021-06-16 ENCOUNTER — APPOINTMENT (OUTPATIENT)
Dept: PLASTIC SURGERY | Facility: CLINIC | Age: 56
End: 2021-06-16
Payer: COMMERCIAL

## 2021-06-16 VITALS
RESPIRATION RATE: 16 BRPM | HEART RATE: 64 BPM | OXYGEN SATURATION: 97 % | DIASTOLIC BLOOD PRESSURE: 60 MMHG | SYSTOLIC BLOOD PRESSURE: 100 MMHG | BODY MASS INDEX: 28.03 KG/M2 | WEIGHT: 158.2 LBS | HEIGHT: 63 IN | TEMPERATURE: 98.4 F

## 2021-06-16 DIAGNOSIS — S21.102D: ICD-10-CM

## 2021-06-16 PROCEDURE — 99024 POSTOP FOLLOW-UP VISIT: CPT

## 2021-06-16 NOTE — REASON FOR VISIT
[Post Op: _________] : a [unfilled] post op visit [FreeTextEntry1] : s/p B/L breast capsulectomy with implant removal /  immediate B/L reconstruction with TE implantation done 5/21/21.

## 2021-06-16 NOTE — PHYSICAL EXAM
[NI] : Normal [de-identified] : NL respiratory effort noted  [de-identified] : bilateral reconstructed breasts with decreased edema, softer.\par Left chest wound clean with granulating base.\par There is some necrotic fat around the edges that was removed bluntly.\par No erythema. No excess drainage (just the area of the open wound).

## 2021-06-16 NOTE — HISTORY OF PRESENT ILLNESS
[FreeTextEntry1] : Pt reports no significant changes.\par She did run out of xeroform and also notes the xeroform smells terrible to her.

## 2021-06-16 NOTE — ASSESSMENT
[FreeTextEntry1] : Improving s/p bilateral mastectomy and TE placement, but with an open wound at the base on the left.\par Will hold off RT for now until this is healing a bit more.\par Pt may hold off using xeroform and start antibiotic ointment on gauze or ABD pad.\par Follow up weekly.

## 2021-06-18 ENCOUNTER — OUTPATIENT (OUTPATIENT)
Dept: OUTPATIENT SERVICES | Facility: HOSPITAL | Age: 56
LOS: 1 days | End: 2021-06-18

## 2021-06-18 DIAGNOSIS — C50.919 MALIGNANT NEOPLASM OF UNSPECIFIED SITE OF UNSPECIFIED FEMALE BREAST: ICD-10-CM

## 2021-06-21 ENCOUNTER — APPOINTMENT (OUTPATIENT)
Dept: HEMATOLOGY ONCOLOGY | Facility: CLINIC | Age: 56
End: 2021-06-21
Payer: COMMERCIAL

## 2021-06-21 ENCOUNTER — RESULT REVIEW (OUTPATIENT)
Age: 56
End: 2021-06-21

## 2021-06-21 VITALS
DIASTOLIC BLOOD PRESSURE: 91 MMHG | BODY MASS INDEX: 27.46 KG/M2 | OXYGEN SATURATION: 98 % | SYSTOLIC BLOOD PRESSURE: 154 MMHG | HEIGHT: 63 IN | WEIGHT: 155 LBS | TEMPERATURE: 98.1 F | HEART RATE: 70 BPM

## 2021-06-21 PROCEDURE — 99072 ADDL SUPL MATRL&STAF TM PHE: CPT

## 2021-06-21 PROCEDURE — 99205 OFFICE O/P NEW HI 60 MIN: CPT

## 2021-06-21 NOTE — REVIEW OF SYSTEMS
[Patient Intake Form Reviewed] : Patient intake form was reviewed [Joint Pain] : joint pain [Joint Stiffness] : joint stiffness [Skin Wound] : skin wound [Fever] : no fever [Dysphagia] : no dysphagia [Odynophagia] : no odynophagia [Chest Pain] : no chest pain [Shortness Of Breath] : no shortness of breath [Abdominal Pain] : no abdominal pain [Anxiety] : no anxiety [Depression] : no depression [Easy Bleeding] : no tendency for easy bleeding [Easy Bruising] : no tendency for easy bruising [Swollen Glands] : no swollen glands [de-identified] : healing breast wound

## 2021-06-21 NOTE — CONSULT LETTER
[Dear  ___] : Dear  [unfilled], [Consult Letter:] : I had the pleasure of evaluating your patient, [unfilled]. [Consult Closing:] : Thank you very much for allowing me to participate in the care of this patient.  If you have any questions, please do not hesitate to contact me. [Please see my note below.] : Please see my note below. [Sincerely,] : Sincerely, [DrShea  ___] : Dr. FISCHER [DrShea ___] : Dr. FISCHER [FreeTextEntry2] : Dr. Tegan Staley [FreeTextEntry3] : Catalino Kellogg MD\par

## 2021-06-21 NOTE — RESULTS/DATA
[FreeTextEntry1] : Ms. Che is a pleasant 55 year-old woman with recently diagnosed [id number] ER/DC+, Her2- left breast cancer status-post bilateral mastectomy and sentinel lymph node biopsy, here to discuss adjuvant treatments.

## 2021-06-21 NOTE — HISTORY OF PRESENT ILLNESS
[de-identified] : Ms. Che has recently been diagnosed with left breast ER/IN+, Her2 negative invasive ductal carcinoma on ultrasound-guided biopsy, which was completed on April 22, 2021.  Core biopsy was ER/IN+, Her2 negative by [facility]. \par \par Emily underwent bilateral mastectomy (right prophylactic) with sentinel node biopsy and reconstruction with tissue expanders on May, 21, 2021. Final surgical pathology revealed left invasive ductal carcinoma (Grade 2), ER/IN+, Her2-, measuring 2.5 x 1.8 x 1.5cm. The anterior margin was positive. [place] lymph node was negative. There was no evidence of DCIS or LCIS. [id number].\par \par Her tissue expanders are currently in place. Some delayed healing on the left, which has delayed adjuvant treatments. Oncotype testing revealed a recurrence score of 11.  She is post-menopausal. Myriad testing was negative.\par

## 2021-06-23 ENCOUNTER — APPOINTMENT (OUTPATIENT)
Dept: PLASTIC SURGERY | Facility: CLINIC | Age: 56
End: 2021-06-23
Payer: COMMERCIAL

## 2021-06-23 VITALS
BODY MASS INDEX: 28.07 KG/M2 | RESPIRATION RATE: 16 BRPM | TEMPERATURE: 98.3 F | HEIGHT: 63 IN | SYSTOLIC BLOOD PRESSURE: 142 MMHG | DIASTOLIC BLOOD PRESSURE: 84 MMHG | WEIGHT: 158.4 LBS | HEART RATE: 66 BPM | OXYGEN SATURATION: 98 %

## 2021-06-23 LAB — SURGICAL PATHOLOGY STUDY: SIGNIFICANT CHANGE UP

## 2021-06-23 PROCEDURE — 99024 POSTOP FOLLOW-UP VISIT: CPT

## 2021-06-23 NOTE — PHYSICAL EXAM
[NI] : Normal [de-identified] : NL respiratory effort noted  [de-identified] : bilateral Douglas in place, in good position\par The left side seems to have settled into a better position.\par No erythema.\par Left sided wound is about the same size at the level of the skin, approx 1.5x2cm, but with increased\par granulation at the base, and less undermining. No residual fat necrosis. No purulence.\par Right side well healed.

## 2021-06-23 NOTE — ASSESSMENT
[FreeTextEntry1] : Doing well.\par Slowly healing left chest wound without evidence of TE infection.\par Not yet ready for expansion or radiation.

## 2021-06-23 NOTE — REASON FOR VISIT
[Post Op: _________] : a [unfilled] post op visit [FreeTextEntry1] : s/p B/L breast capsulectomy with implant removal / immediate B/L reconstruction with TE implantation done 5/21/21.  Patient c/o pain under LT breast with certain movements.  Also reports she did not take Clindamycin.

## 2021-06-23 NOTE — HISTORY OF PRESENT ILLNESS
[FreeTextEntry1] : Pt reports that she did not end up taking the antibiotics.\par Not due to any particular concern, just felt like "living dangerously" as she didn't feel she had any infection.\par Feeling ok otherwise. Some minor pain along the left chest wall after doing some situps.\par Anxious to resume working out more.

## 2021-06-29 ENCOUNTER — APPOINTMENT (OUTPATIENT)
Dept: PLASTIC SURGERY | Facility: CLINIC | Age: 56
End: 2021-06-29

## 2021-06-29 ENCOUNTER — APPOINTMENT (OUTPATIENT)
Dept: PLASTIC SURGERY | Facility: CLINIC | Age: 56
End: 2021-06-29
Payer: COMMERCIAL

## 2021-06-29 VITALS
SYSTOLIC BLOOD PRESSURE: 136 MMHG | HEIGHT: 63 IN | HEART RATE: 74 BPM | WEIGHT: 153 LBS | BODY MASS INDEX: 27.11 KG/M2 | DIASTOLIC BLOOD PRESSURE: 82 MMHG | TEMPERATURE: 98 F | OXYGEN SATURATION: 98 % | RESPIRATION RATE: 16 BRPM

## 2021-06-29 PROCEDURE — 99024 POSTOP FOLLOW-UP VISIT: CPT

## 2021-06-29 RX ORDER — ACETAMINOPHEN 325 MG/1
TABLET, FILM COATED ORAL
Refills: 0 | Status: DISCONTINUED | COMMUNITY
End: 2021-06-29

## 2021-06-29 RX ORDER — IBUPROFEN 200 MG/1
TABLET, COATED ORAL
Refills: 0 | Status: DISCONTINUED | COMMUNITY
End: 2021-06-29

## 2021-06-29 NOTE — PHYSICAL EXAM
[NI] : Normal [de-identified] : NL respiratory effort noted  [de-identified] : Bilateral Douglas in place.\par Right side is healed.\par Left side is softer, with a better contour since last week.\par There is new epithelialization growing in a "peninsula" from the inferior aspect of the wound.\par There is still some undermining in the crescent in the superior aspect of the wound.\par Some necrotic fat was removed from this area, especially laterally.\par No erythema,\par No induration.\par Minimal drainage on dressings.

## 2021-06-29 NOTE — HISTORY OF PRESENT ILLNESS
[FreeTextEntry1] : Pt without complaints, except that she is impatient with the healing process and ready to move ahead with radiation.\par Wants to start doing more exercise and start lifting more.

## 2021-06-29 NOTE — REASON FOR VISIT
[Post Op: _________] : a [unfilled] post op visit [FreeTextEntry1] : s/p B/L breast capsulectomy with implant removal / immediate B/L reconstruction with TE implantation done 5/21/21.   Patient is feeling well and has no complaints at this time.

## 2021-06-29 NOTE — ASSESSMENT
[FreeTextEntry1] : Gradually healing after left chest mastectomy skin necrosis.\par No sign of infection.\par Improved contour.\par Will plan on holding expansion for now.\par Will discuss with Dr. Kelley as well.

## 2021-07-01 ENCOUNTER — APPOINTMENT (OUTPATIENT)
Dept: BREAST CENTER | Facility: CLINIC | Age: 56
End: 2021-07-01
Payer: COMMERCIAL

## 2021-07-01 VITALS
WEIGHT: 153 LBS | HEIGHT: 63 IN | BODY MASS INDEX: 27.11 KG/M2 | TEMPERATURE: 97.2 F | HEART RATE: 67 BPM | SYSTOLIC BLOOD PRESSURE: 148 MMHG | DIASTOLIC BLOOD PRESSURE: 86 MMHG

## 2021-07-01 PROCEDURE — 99024 POSTOP FOLLOW-UP VISIT: CPT

## 2021-07-01 NOTE — CONSULT LETTER
[Dear  ___] : Dear  [unfilled], [Courtesy Letter:] : I had the pleasure of seeing your patient, [unfilled], in my office today. [Please see my note below.] : Please see my note below. [Consult Closing:] : Thank you very much for allowing me to participate in the care of this patient.  If you have any questions, please do not hesitate to contact me. [Sincerely,] : Sincerely, [FreeTextEntry3] : Tegan Staley MD

## 2021-07-01 NOTE — ASSESSMENT
[FreeTextEntry1] : Pt is a 55 year old MYRIAD negative white female here post op 5/21/21 R prophylactic and L mastectomy w/ SLNbx and bilat recon w/ TE per Dr. Herrera for recent L U/S bx 4/22/21 showing IDC, ER/AZ status + >95%, Her2 CISH negative.\par Oncotype 11, low.\par 5/21/21 Surgical PATH: R negative, L IDC mod diff, anterior margin + invasive ca, posterior margin 1.0mm from invasive ca, pT2N0/1Mx, measures 2.5x1.8x1.5cm, Grade 2, -DCIS, -LCIS, - LVI, focal ALH, FCC w/ apocrine metaplasia, ER+ >95%, AZ+ >95%, Her2 CISH-\par PCP is Bethany Wright MD.\par Pt has open wound at T junction on left and waiting closure prior to starting EBRT. Also saw Dr. Colorado about starting AI and will do after radiation. \par Hx of bilat breast implants placed 2004. \par Sozo was done last visit, 1 mos ago. \par Fhx: Breast CA - mother 85, maunt-unknown ag at diagnosis. Not AJ. \par 4/27/21 CBE: Right neg, Left 1.5 cm mass. No adenopathy. \par CBE: Inverted T inc after bilat mastectomy, L w/ small area ~3cm midline flap juncture, clean w/ granulation tissue. No left SC lymphadenopathy. Full ROM and no LE.\par Pt wondering about genetic testing results for her daughter - gave copy. Pt waiting on wound closure to start EBRT w/ Dr. Kelley. F/u w/ Bess Cintron, and Allie as scheduled. F/u 2 mos or sooner as needed. \par Pt requests new GYN, card for Dr. Villaseñor given.

## 2021-07-01 NOTE — HISTORY OF PRESENT ILLNESS
[FreeTextEntry1] : Pt is a 55 year old MYRIAD negative white female here post op 5/21/21 R prophylactic and L mastectomy w/ SLNbx and bilat recon w/ TE per Dr. Herrera for recent L U/S bx 4/22/21 showing IDC, ER/MI status + >95%, Her2 CISH negative.\par Oncotype 11, low.\par 5/21/21 Surgical PATH: R negative, L IDC mod diff, anterior margin + invasive ca, posterior margin 1.0mm from invasive ca, pT2N0/1Mx, measures 2.5x1.8x1.5cm, Grade 2, -DCIS, -LCIS, - LVI, focal ALH, FCC w/ apocrine metaplasia, ER+ >95%, MI+ >95%, Her2 CISH-\par PCP is Bethany Wright MD.\par Pt has open wound at T junction on left and waiting closure prior to starting EBRT. Also saw Dr. Colorado about starting AI and will do after radiation. \par Hx of bilat breast implants placed 2004. \par Sozo was done last visit, 1 mos ago. \par Fhx: Breast CA - mother 85, maunt-unknown ag at diagnosis. Not AJ. \par 4/27/21 CBE: Right neg, Left 1.5 cm mass. No adenopathy. \par  \par \par

## 2021-07-01 NOTE — PHYSICAL EXAM
[de-identified] : Bilat mastectomy w/ TE [de-identified] : small area of wound dehiscence approximately 3cm at lower flap midline juncture, clean and granulating well.

## 2021-07-06 ENCOUNTER — APPOINTMENT (OUTPATIENT)
Dept: PLASTIC SURGERY | Facility: CLINIC | Age: 56
End: 2021-07-06
Payer: COMMERCIAL

## 2021-07-06 VITALS
RESPIRATION RATE: 16 BRPM | WEIGHT: 153.8 LBS | OXYGEN SATURATION: 97 % | DIASTOLIC BLOOD PRESSURE: 78 MMHG | SYSTOLIC BLOOD PRESSURE: 118 MMHG | HEART RATE: 68 BPM | HEIGHT: 63 IN | BODY MASS INDEX: 27.25 KG/M2 | TEMPERATURE: 97.9 F

## 2021-07-06 PROCEDURE — 99024 POSTOP FOLLOW-UP VISIT: CPT

## 2021-07-06 NOTE — REASON FOR VISIT
[Post Op: _________] : a [unfilled] post op visit [FreeTextEntry1] : s/p B/L capsulectomy with implant removal / immediate B/L reconstruction with TE implantation done 5/21/21.  Patient is feeling well today and has no complaints at this time.

## 2021-07-06 NOTE — HISTORY OF PRESENT ILLNESS
[FreeTextEntry1] : Pt without complaints.\par Has an appointment with Dr Kelley next week for RT discussion/simulation(?).\par

## 2021-07-06 NOTE — ASSESSMENT
[FreeTextEntry1] : Pt gradually healing.\par Starting fills to get as much in as possible prior to RT.\par Continue local wound care and weekly follow ups.\par Will discuss with Dr Kelley.\par The fill placed in the OR was 250ml on each side. The fluid added today 7/6/21 was 50ml, bringing the total to 300ml.

## 2021-07-06 NOTE — PHYSICAL EXAM
[NI] : Normal [de-identified] : NL respiratory effort noted  [de-identified] : Bilateral Douglas in place.\par No erythema.\par Left sided wound with no or minimal undermining.\par No drainage. Increased epithelialization.\par After alcohol prep and magnet localization, 50ml fill performed without difficulty on each side. Pt tolerated well. \par No change in the wound after the fill.

## 2021-07-13 ENCOUNTER — APPOINTMENT (OUTPATIENT)
Dept: PLASTIC SURGERY | Facility: CLINIC | Age: 56
End: 2021-07-13
Payer: COMMERCIAL

## 2021-07-13 VITALS
TEMPERATURE: 97.3 F | OXYGEN SATURATION: 98 % | SYSTOLIC BLOOD PRESSURE: 116 MMHG | WEIGHT: 152.8 LBS | BODY MASS INDEX: 27.07 KG/M2 | DIASTOLIC BLOOD PRESSURE: 70 MMHG | RESPIRATION RATE: 15 BRPM | HEART RATE: 71 BPM | HEIGHT: 63 IN

## 2021-07-13 PROCEDURE — 99024 POSTOP FOLLOW-UP VISIT: CPT

## 2021-07-13 NOTE — HISTORY OF PRESENT ILLNESS
[FreeTextEntry1] : No complaints.\par She did well after the fill last week. Just some discomfort lying on her side.

## 2021-07-13 NOTE — REASON FOR VISIT
[Post Op: _________] : a [unfilled] post op visit [FreeTextEntry1] : s/p B/L capsulectomy with implant removal / immediate B/L reconstruction with TE implantation done 5/21/21.  Patient reports no issues after last TE fill and has no complaints at this time.

## 2021-07-13 NOTE — PHYSICAL EXAM
[NI] : Normal [de-identified] : Bilateral Douglas in place.\par Left side more contracted at the wound.\par Wound continues to heal.\par No significant undermining.\par 2-3 mm strip yet to heal.\par After alcohol prep and magnet localization, 50ml fill performed without difficulty on the right. The left side port is deeper and the needle was repositioned once (after reprep) and the fill flowed in easily. Pt tolerated well.\par

## 2021-07-13 NOTE — ASSESSMENT
[FreeTextEntry1] : Pt doing well postop.\par Wound healing slowly.\par Starting fills to get as much in as possible prior to RT.\par Continue local wound care and weekly follow ups.\par Will discuss with Dr Kelley.\par The fill placed in the OR was 250ml on each side. The fluid added today 7/13/21 was 50ml,  7/6/21 was 50ml, bringing the total to 350ml.

## 2021-07-20 ENCOUNTER — APPOINTMENT (OUTPATIENT)
Dept: PLASTIC SURGERY | Facility: CLINIC | Age: 56
End: 2021-07-20
Payer: COMMERCIAL

## 2021-07-20 VITALS
HEIGHT: 63 IN | DIASTOLIC BLOOD PRESSURE: 82 MMHG | WEIGHT: 152 LBS | SYSTOLIC BLOOD PRESSURE: 120 MMHG | BODY MASS INDEX: 26.93 KG/M2 | TEMPERATURE: 98 F | RESPIRATION RATE: 16 BRPM | OXYGEN SATURATION: 98 % | HEART RATE: 70 BPM

## 2021-07-20 PROCEDURE — 99024 POSTOP FOLLOW-UP VISIT: CPT

## 2021-07-20 NOTE — HISTORY OF PRESENT ILLNESS
[FreeTextEntry1] : Pt reports no complaints. She is able to do more exercise, including holding a plank. She is holding off doing pushups as per my instructions.

## 2021-07-20 NOTE — PHYSICAL EXAM
[NI] : Normal [de-identified] : NL respiratory effort noted  [de-identified] : Bilateral tissue expanders in place.\par Left wound with dry crust in a 3mm strip at the inferior aspect.\par No erythema. Minimal ecchymosis from the last fill, bilateral.\par After alcohol prep and magnet localization, 50ml fill performed without difficulty on the right, 50ml fill on the left with the port much deeper, as noted on prior visits. Pt tolerated well.\par

## 2021-07-20 NOTE — ASSESSMENT
[FreeTextEntry1] : Pt doing well postop.\par Wound healing slowly.\par Planning to fills this week to get as much in as possible prior to RT.\par Continue local wound care and weekly follow ups.\par I will see her on Friday for a second fill and Dr. Kelley will have her come in for simulation on Monday.\par The fill placed in the OR was 250ml on each side. The fluid added today 7/20/21 was 50ml, 7/13/21 was 50ml, 7/6/21 was 50ml, bringing the total to 400ml. \par

## 2021-07-20 NOTE — REASON FOR VISIT
[Follow-Up: _____] : a [unfilled] follow-up visit [FreeTextEntry1] : POST OP VISIT S/P B/L CAPSULECTOMY WITH IMPLANT REMOVAL

## 2021-07-23 ENCOUNTER — APPOINTMENT (OUTPATIENT)
Dept: PLASTIC SURGERY | Facility: CLINIC | Age: 56
End: 2021-07-23
Payer: COMMERCIAL

## 2021-07-23 VITALS
TEMPERATURE: 97.8 F | RESPIRATION RATE: 16 BRPM | OXYGEN SATURATION: 98 % | HEART RATE: 65 BPM | DIASTOLIC BLOOD PRESSURE: 78 MMHG | BODY MASS INDEX: 26.75 KG/M2 | HEIGHT: 63 IN | SYSTOLIC BLOOD PRESSURE: 130 MMHG | WEIGHT: 151 LBS

## 2021-07-23 PROCEDURE — 99024 POSTOP FOLLOW-UP VISIT: CPT

## 2021-07-23 NOTE — PHYSICAL EXAM
[NI] : Normal [de-identified] : NL respiratory effort noted  [de-identified] : Bilateral tissue expanders in place.\par Left wound with dry crust in a 3mm strip at the inferior aspect.\par This was removed and there is no open area deep to this.\par Two tiny areas not yet epithelialized, 2-3mm each.\par No erythema. Minimal ecchymosis from the last fill, bilateral.\par After alcohol prep and magnet localization, 50ml fill performed without difficulty on the right, 50ml fill on the left with the port much deeper, as noted on prior visits. Pt tolerated well.\par

## 2021-07-23 NOTE — ASSESSMENT
[FreeTextEntry1] : Doing well. Pretty happy with her size.\par Today's fill is the last prior to RT.\par \par The fill placed in the OR was 250ml on each side. The fluid added today 7/23/21 was 50ml,  7/20/21 was 50ml, 7/13/21 was 50ml, 7/6/21 was 50ml, bringing the total to 450ml.\par \par Follow up a few weeks into radiation.

## 2021-07-26 ENCOUNTER — NON-APPOINTMENT (OUTPATIENT)
Age: 56
End: 2021-07-26

## 2021-07-28 ENCOUNTER — APPOINTMENT (OUTPATIENT)
Dept: PLASTIC SURGERY | Facility: CLINIC | Age: 56
End: 2021-07-28
Payer: COMMERCIAL

## 2021-07-28 VITALS
WEIGHT: 150 LBS | HEART RATE: 54 BPM | SYSTOLIC BLOOD PRESSURE: 128 MMHG | HEIGHT: 63 IN | RESPIRATION RATE: 16 BRPM | TEMPERATURE: 98.2 F | BODY MASS INDEX: 26.58 KG/M2 | DIASTOLIC BLOOD PRESSURE: 80 MMHG | OXYGEN SATURATION: 97 %

## 2021-07-28 PROCEDURE — 99024 POSTOP FOLLOW-UP VISIT: CPT

## 2021-07-28 RX ORDER — ZOLPIDEM TARTRATE 10 MG/1
10 TABLET ORAL
Refills: 0 | Status: COMPLETED | COMMUNITY
Start: 1900-01-01 | End: 2021-07-28

## 2021-07-28 NOTE — ASSESSMENT
[FreeTextEntry1] : Now fully healed, but with decreased fill on the left compared to the right.\par The left side port has been deeper than the right and may have resulted in some of the fill going into the SQ tissues.\par This may also represent a slow leak.\par Discussed with Dr. Kelley. Agreed that we should proceed with RT as she had been delayed already.\par He will monitor the volume with imaging and adjust as needed.\par Pt's TE can be refilled once RT is done (similar to protocols at other institutions where Douglas are deflated prior to RT.\par If there is a leak, the TE will need to eventually be replaced.\par Pt to follow up with me 3-4 weeks into RT.\par \par I also updated Dr. Staley on pt's status.

## 2021-07-28 NOTE — PHYSICAL EXAM
[NI] : Normal [de-identified] : Right TE intact and full.\par Left TE in place with palpable fluid, but definitely smaller than the right.\par The wound is well healed with no crust or drainage.\par No erythema or induration.

## 2021-07-28 NOTE — REASON FOR VISIT
[Post Op: _________] : a [unfilled] post op visit [FreeTextEntry1] : s/p B/L capsulectomy with implant removal / immediate B/L breast reconstruction with TE implantation done 5/21/21.  Patient states she will be starting RT tomorrow.  Also states she is no longer taking the Zolpidem.

## 2021-07-28 NOTE — HISTORY OF PRESENT ILLNESS
[FreeTextEntry1] : Pt reports no pain or drainage.\par She does feel that the left breast recon is not as large as the right.

## 2021-07-30 ENCOUNTER — OUTPATIENT (OUTPATIENT)
Dept: OUTPATIENT SERVICES | Facility: HOSPITAL | Age: 56
LOS: 1 days | End: 2021-07-30
Payer: COMMERCIAL

## 2021-07-30 DIAGNOSIS — C50.919 MALIGNANT NEOPLASM OF UNSPECIFIED SITE OF UNSPECIFIED FEMALE BREAST: ICD-10-CM

## 2021-08-02 ENCOUNTER — RESULT REVIEW (OUTPATIENT)
Age: 56
End: 2021-08-02

## 2021-08-02 ENCOUNTER — APPOINTMENT (OUTPATIENT)
Dept: HEMATOLOGY ONCOLOGY | Facility: CLINIC | Age: 56
End: 2021-08-02
Payer: COMMERCIAL

## 2021-08-02 ENCOUNTER — NON-APPOINTMENT (OUTPATIENT)
Age: 56
End: 2021-08-02

## 2021-08-02 VITALS
HEIGHT: 63 IN | BODY MASS INDEX: 26.31 KG/M2 | WEIGHT: 148.5 LBS | SYSTOLIC BLOOD PRESSURE: 144 MMHG | HEART RATE: 66 BPM | OXYGEN SATURATION: 99 % | TEMPERATURE: 97.5 F | DIASTOLIC BLOOD PRESSURE: 87 MMHG

## 2021-08-02 LAB
BASOPHILS # BLD AUTO: 0.03 K/UL — SIGNIFICANT CHANGE UP (ref 0–0.2)
BASOPHILS NFR BLD AUTO: 0.5 % — SIGNIFICANT CHANGE UP (ref 0–2)
EOSINOPHIL # BLD AUTO: 0.08 K/UL — SIGNIFICANT CHANGE UP (ref 0–0.5)
EOSINOPHIL NFR BLD AUTO: 1.3 % — SIGNIFICANT CHANGE UP (ref 0–6)
HCT VFR BLD CALC: 38.4 % — SIGNIFICANT CHANGE UP (ref 34.5–45)
HGB BLD-MCNC: 12.7 G/DL — SIGNIFICANT CHANGE UP (ref 11.5–15.5)
IMM GRANULOCYTES NFR BLD AUTO: 0.3 % — SIGNIFICANT CHANGE UP (ref 0–1.5)
LYMPHOCYTES # BLD AUTO: 1.61 K/UL — SIGNIFICANT CHANGE UP (ref 1–3.3)
LYMPHOCYTES # BLD AUTO: 27.2 % — SIGNIFICANT CHANGE UP (ref 13–44)
MCHC RBC-ENTMCNC: 31.4 PG — SIGNIFICANT CHANGE UP (ref 27–34)
MCHC RBC-ENTMCNC: 33.1 GM/DL — SIGNIFICANT CHANGE UP (ref 32–36)
MCV RBC AUTO: 95 FL — SIGNIFICANT CHANGE UP (ref 80–100)
MONOCYTES # BLD AUTO: 0.39 K/UL — SIGNIFICANT CHANGE UP (ref 0–0.9)
MONOCYTES NFR BLD AUTO: 6.6 % — SIGNIFICANT CHANGE UP (ref 2–14)
NEUTROPHILS # BLD AUTO: 3.8 K/UL — SIGNIFICANT CHANGE UP (ref 1.8–7.4)
NEUTROPHILS NFR BLD AUTO: 64.1 % — SIGNIFICANT CHANGE UP (ref 43–77)
NRBC # BLD: 0 /100 WBCS — SIGNIFICANT CHANGE UP (ref 0–0)
PLATELET # BLD AUTO: 246 K/UL — SIGNIFICANT CHANGE UP (ref 150–400)
RBC # BLD: 4.04 M/UL — SIGNIFICANT CHANGE UP (ref 3.8–5.2)
RBC # FLD: 11.4 % — SIGNIFICANT CHANGE UP (ref 10.3–14.5)
WBC # BLD: 5.93 K/UL — SIGNIFICANT CHANGE UP (ref 3.8–10.5)
WBC # FLD AUTO: 5.93 K/UL — SIGNIFICANT CHANGE UP (ref 3.8–10.5)

## 2021-08-02 PROCEDURE — 80053 COMPREHEN METABOLIC PANEL: CPT

## 2021-08-02 PROCEDURE — 85027 COMPLETE CBC AUTOMATED: CPT

## 2021-08-02 PROCEDURE — 99213 OFFICE O/P EST LOW 20 MIN: CPT

## 2021-08-02 NOTE — HISTORY OF PRESENT ILLNESS
[de-identified] : Ms. Che has recently been diagnosed with left breast ER/WV+, Her2 negative invasive ductal carcinoma on ultrasound-guided biopsy, which was completed on April 22, 2021.  Core biopsy was ER/WV+, Her2 negative by [facility]. \par \par Emily underwent bilateral mastectomy (right was prophylactic) with sentinel node biopsy and reconstruction with tissue expanders on May, 21, 2021. Final surgical pathology revealed left invasive ductal carcinoma (Grade 2), ER/WV+, Her2-, measuring 2.5 x 1.8 x 1.5cm. The anterior margin was positive. [place] lymph node was negative. There was no evidence of DCIS or LCIS. [id number].\par \par Her tissue expanders are currently in place. Some delayed healing on the left, which has delayed adjuvant treatments. Oncotype testing revealed a recurrence score of 11.  She is post-menopausal. Myriad testing was negative.\par  [de-identified] : 2 fractions of adjuvant radiation completed, delayed due to slow healing of left mastectomy wound. She denies any ongoing fevers or chills, no headache, no lumps or bumps, no weight loss, no bleeding or bruising. No new medications.\par

## 2021-08-02 NOTE — REVIEW OF SYSTEMS
[Fever] : no fever [Fatigue] : no fatigue [Recent Change In Weight] : ~T no recent weight change [Dysphagia] : no dysphagia [Odynophagia] : no odynophagia [Chest Pain] : no chest pain [Shortness Of Breath] : no shortness of breath [Abdominal Pain] : no abdominal pain [Skin Rash] : no skin rash [Anxiety] : no anxiety [Depression] : no depression [Easy Bleeding] : no tendency for easy bleeding [Easy Bruising] : no tendency for easy bruising [Swollen Glands] : no swollen glands

## 2021-08-02 NOTE — RESULTS/DATA
[FreeTextEntry1] : Ms. Che is a pleasant 55 year-old woman with recently diagnosed [id number] ER/WA+, Her2- left breast cancer status-post bilateral mastectomy and sentinel lymph node biopsy, here to discuss adjuvant treatments.

## 2021-08-02 NOTE — PHYSICAL EXAM
[Fully active, able to carry on all pre-disease performance without restriction] : Status 0 - Fully active, able to carry on all pre-disease performance without restriction [Normal] : affect appropriate [de-identified] : Left breast surgical site healed, no drainage, no purulence

## 2021-08-03 ENCOUNTER — NON-APPOINTMENT (OUTPATIENT)
Age: 56
End: 2021-08-03

## 2021-08-03 LAB
ALBUMIN SERPL ELPH-MCNC: 4.7 G/DL — SIGNIFICANT CHANGE UP (ref 3.3–5)
ALP SERPL-CCNC: 78 U/L — SIGNIFICANT CHANGE UP (ref 40–120)
ALT FLD-CCNC: 9 U/L — LOW (ref 10–45)
ANION GAP SERPL CALC-SCNC: 11 MMOL/L — SIGNIFICANT CHANGE UP (ref 5–17)
AST SERPL-CCNC: 23 U/L — SIGNIFICANT CHANGE UP (ref 10–40)
BILIRUB SERPL-MCNC: 0.5 MG/DL — SIGNIFICANT CHANGE UP (ref 0.2–1.2)
BUN SERPL-MCNC: 13 MG/DL — SIGNIFICANT CHANGE UP (ref 7–23)
CALCIUM SERPL-MCNC: 10.1 MG/DL — SIGNIFICANT CHANGE UP (ref 8.4–10.5)
CHLORIDE SERPL-SCNC: 102 MMOL/L — SIGNIFICANT CHANGE UP (ref 96–108)
CO2 SERPL-SCNC: 25 MMOL/L — SIGNIFICANT CHANGE UP (ref 22–31)
CREAT SERPL-MCNC: 0.73 MG/DL — SIGNIFICANT CHANGE UP (ref 0.5–1.3)
GLUCOSE SERPL-MCNC: 98 MG/DL — SIGNIFICANT CHANGE UP (ref 70–99)
POTASSIUM SERPL-MCNC: 4.6 MMOL/L — SIGNIFICANT CHANGE UP (ref 3.5–5.3)
POTASSIUM SERPL-SCNC: 4.6 MMOL/L — SIGNIFICANT CHANGE UP (ref 3.5–5.3)
PROT SERPL-MCNC: 6.9 G/DL — SIGNIFICANT CHANGE UP (ref 6–8.3)
SODIUM SERPL-SCNC: 138 MMOL/L — SIGNIFICANT CHANGE UP (ref 135–145)

## 2021-08-16 ENCOUNTER — OUTPATIENT (OUTPATIENT)
Dept: OUTPATIENT SERVICES | Facility: HOSPITAL | Age: 56
LOS: 1 days | End: 2021-08-16

## 2021-08-24 ENCOUNTER — APPOINTMENT (OUTPATIENT)
Dept: RADIOLOGY | Facility: CLINIC | Age: 56
End: 2021-08-24
Payer: COMMERCIAL

## 2021-08-24 PROCEDURE — 77080 DXA BONE DENSITY AXIAL: CPT

## 2021-08-25 ENCOUNTER — NON-APPOINTMENT (OUTPATIENT)
Age: 56
End: 2021-08-25

## 2021-08-31 ENCOUNTER — APPOINTMENT (OUTPATIENT)
Dept: PLASTIC SURGERY | Facility: CLINIC | Age: 56
End: 2021-08-31
Payer: COMMERCIAL

## 2021-08-31 VITALS
SYSTOLIC BLOOD PRESSURE: 116 MMHG | WEIGHT: 142.4 LBS | OXYGEN SATURATION: 99 % | HEART RATE: 61 BPM | RESPIRATION RATE: 16 BRPM | BODY MASS INDEX: 25.23 KG/M2 | TEMPERATURE: 97.9 F | HEIGHT: 63 IN | DIASTOLIC BLOOD PRESSURE: 72 MMHG

## 2021-08-31 DIAGNOSIS — T85.43XA LEAKAGE OF BREAST PROSTHESIS AND IMPLANT, INITIAL ENCOUNTER: ICD-10-CM

## 2021-08-31 PROCEDURE — 99215 OFFICE O/P EST HI 40 MIN: CPT

## 2021-08-31 NOTE — ASSESSMENT
[FreeTextEntry1] : Pt doing well overall, but will need replacement of the left tissue expander.\par As long as the skin has healed, I would like to get this replaced within 6 weeks from the end of radiation treatment.\par If not, then I would wait 6 months for the tissues to soften.\par Pt is aware and agrees with this plan.\par Follow up in 4 weeks to assess skin and schedule surgery.

## 2021-08-31 NOTE — HISTORY OF PRESENT ILLNESS
[FreeTextEntry1] : Pt reports she is taking a day or two off of radiation because of skin burns.\par She is using miaderm and silvadene. She says the silvadene smells like eggs.

## 2021-08-31 NOTE — REASON FOR VISIT
[Follow-Up: _____] : a [unfilled] follow-up visit [FreeTextEntry1] : s/p B/L capsulectomy with implant removal / immediate B/L breast reconstruction with TE implantation done 5/21/21.  Patient states she had two weeks left of RT.  Also reports the LT TE has deflated.

## 2021-08-31 NOTE — REVIEW OF SYSTEMS
[As Noted in HPI] : as noted in HPI [Negative] : Musculoskeletal [FreeTextEntry2] : Getting back to working out and losing weight, 10 lbs in the last month (nearly 30 lbs since April). Feeling better.

## 2021-08-31 NOTE — PHYSICAL EXAM
[NI] : Normal [de-identified] : NL respiratory effort noted  [de-identified] : Right recon breast with TE in place. well healed.\par Left chest wall with deflated TE in place.\par No open wounds. Inferior scar kurtis and dry.\par Significant radiation changes especially in superior pole and medially.

## 2021-09-02 ENCOUNTER — APPOINTMENT (OUTPATIENT)
Age: 56
End: 2021-09-02
Payer: COMMERCIAL

## 2021-09-02 VITALS
WEIGHT: 139 LBS | SYSTOLIC BLOOD PRESSURE: 121 MMHG | DIASTOLIC BLOOD PRESSURE: 73 MMHG | HEART RATE: 59 BPM | HEIGHT: 63 IN | TEMPERATURE: 97.3 F | BODY MASS INDEX: 24.63 KG/M2

## 2021-09-02 PROCEDURE — 93702 BIS XTRACELL FLUID ANALYSIS: CPT

## 2021-09-02 PROCEDURE — 99214 OFFICE O/P EST MOD 30 MIN: CPT | Mod: 25

## 2021-09-02 NOTE — HISTORY OF PRESENT ILLNESS
[FreeTextEntry1] : Pt is a 55 year old MYRIAD negative white female here for follow up after 5/21/21 R prophylactic and L mastectomy w/ SLNbx and bilat recon w/ TE per Dr. Herrera for recent L U/S bx 4/22/21 showing IDC, ER/DE status + >95%, Her2 CISH negative.\par Oncotype 11, low.\par \par 5/21/21 Surgical PATH: R negative, L IDC mod diff, anterior margin + invasive ca, posterior margin 1.0mm from invasive ca, pT2N0/1Mx, measures 2.5x1.8x1.5cm, Grade 2, -DCIS, -LCIS, - LVI, focal ALH, FCC w/ apocrine metaplasia, ER+ >95%, DE+ >95%, Her2 CISH-\par PCP is Bethany Wright MD.\par \par Pt seeing [facility] for L arm cording w/ improvement immediately after. \par Saw Dr. Colorado about starting AI and will start after radiation. \par Hx of bilat breast implants placed 2004. \par Sozo was done last visit, 1 mos ago. \par Fhx: Breast CA - mother 85, maunt-unknown ag at diagnosis. Not AJ. \par \par \par

## 2021-09-02 NOTE — ASSESSMENT
[FreeTextEntry1] : Pt is a 55 year old MYRIAD negative white female here for follow up after 5/21/21 R prophylactic and L mastectomy w/ SLNbx and bilat recon w/ TE per Dr. Herrera for recent L U/S bx 4/22/21 showing IDC, ER/MD status + >95%, Her2 CISH negative.\par Oncotype 11, low.\par \par Pt's left expander is deflated, Dr. Herrera aware. \par 5/21/21 Surgical PATH: R negative, L IDC mod diff, anterior margin + invasive ca, posterior margin 1.0mm from invasive ca, pT2N0/1Mx, measures 2.5x1.8x1.5cm, Grade 2, -DCIS, -LCIS, - LVI, focal ALH, FCC w/ apocrine metaplasia, ER+ >95%, MD+ >95%, Her2 CISH-\par PCP is Bethany Wright MD.\par \par Pt seeing Parker for L arm cording w/ improvement immediately after. \par Saw Dr. Colorado about starting AI and will start after radiation. \par Hx of bilat breast implants placed 2004. \par Sozo was done last visit, 1 mos ago. \par Fhx: Breast CA - mother 85, maunt-unknown ag at diagnosis. Not AJ. \par \par CBE: Bilat mastectomy with TE. L EBRT changes, erythema whole breast and minimal skin breakdown in L axilla area. No lymphadenopathy. Full ROM and no LE.\par Continue EBRT w/ Dr. Kelley and L breast localized erythema and skin breakdown treatment per Allie. F/u 6 mos. or sooner as needed. Continue w/ cording treatment with [facility] and encouraged massage at home as well. Plan for TE exchange w/ Sharon in 2-3 months. F/u w/ Dr. Kellogg as scheduled - AI therapy after radiation.\par

## 2021-09-02 NOTE — PHYSICAL EXAM
[Normocephalic] : normocephalic [Atraumatic] : atraumatic [Supple] : supple [No Supraclavicular Adenopathy] : no supraclavicular adenopathy [Examined in the supine and seated position] : examined in the supine and seated position [Asymmetrical] : asymmetrical [No dominant masses] : no dominant masses in right breast  [No dominant masses] : no dominant masses left breast [No Nipple Retraction] : no left nipple retraction [No Nipple Discharge] : no left nipple discharge [No Axillary Lymphadenopathy] : no left axillary lymphadenopathy [No Edema] : no edema [No Swelling] : no swelling [Full ROM] : full range of motion [No Rashes] : no rashes [No Ulceration] : no ulceration [de-identified] : Bilat mastectomy with TE.  Left expander is deflated. [de-identified] : L EBRT changes, radiation erythema along lines of treatment and minimal superficial skin breakdown in L axilla area. [de-identified] : Left cording

## 2021-09-27 ENCOUNTER — RESULT REVIEW (OUTPATIENT)
Age: 56
End: 2021-09-27

## 2021-09-27 ENCOUNTER — OUTPATIENT (OUTPATIENT)
Dept: OUTPATIENT SERVICES | Facility: HOSPITAL | Age: 56
LOS: 1 days | End: 2021-09-27
Payer: COMMERCIAL

## 2021-09-27 ENCOUNTER — APPOINTMENT (OUTPATIENT)
Dept: HEMATOLOGY ONCOLOGY | Facility: CLINIC | Age: 56
End: 2021-09-27
Payer: COMMERCIAL

## 2021-09-27 ENCOUNTER — NON-APPOINTMENT (OUTPATIENT)
Age: 56
End: 2021-09-27

## 2021-09-27 VITALS
HEART RATE: 55 BPM | HEIGHT: 63 IN | TEMPERATURE: 97.9 F | WEIGHT: 136.38 LBS | SYSTOLIC BLOOD PRESSURE: 145 MMHG | OXYGEN SATURATION: 99 % | BODY MASS INDEX: 24.16 KG/M2 | DIASTOLIC BLOOD PRESSURE: 81 MMHG

## 2021-09-27 DIAGNOSIS — C50.919 MALIGNANT NEOPLASM OF UNSPECIFIED SITE OF UNSPECIFIED FEMALE BREAST: ICD-10-CM

## 2021-09-27 LAB
ALBUMIN SERPL ELPH-MCNC: 4.8 G/DL — SIGNIFICANT CHANGE UP (ref 3.3–5)
ALP SERPL-CCNC: 74 U/L — SIGNIFICANT CHANGE UP (ref 40–120)
ALT FLD-CCNC: 12 U/L — SIGNIFICANT CHANGE UP (ref 10–45)
ANION GAP SERPL CALC-SCNC: 12 MMOL/L — SIGNIFICANT CHANGE UP (ref 5–17)
AST SERPL-CCNC: 30 U/L — SIGNIFICANT CHANGE UP (ref 10–40)
BASOPHILS # BLD AUTO: 0.02 K/UL — SIGNIFICANT CHANGE UP (ref 0–0.2)
BASOPHILS NFR BLD AUTO: 0.4 % — SIGNIFICANT CHANGE UP (ref 0–2)
BILIRUB SERPL-MCNC: 0.4 MG/DL — SIGNIFICANT CHANGE UP (ref 0.2–1.2)
BUN SERPL-MCNC: 7 MG/DL — SIGNIFICANT CHANGE UP (ref 7–23)
CALCIUM SERPL-MCNC: 10.3 MG/DL — SIGNIFICANT CHANGE UP (ref 8.4–10.5)
CHLORIDE SERPL-SCNC: 99 MMOL/L — SIGNIFICANT CHANGE UP (ref 96–108)
CO2 SERPL-SCNC: 24 MMOL/L — SIGNIFICANT CHANGE UP (ref 22–31)
CREAT SERPL-MCNC: 0.7 MG/DL — SIGNIFICANT CHANGE UP (ref 0.5–1.3)
EOSINOPHIL # BLD AUTO: 0.07 K/UL — SIGNIFICANT CHANGE UP (ref 0–0.5)
EOSINOPHIL NFR BLD AUTO: 1.2 % — SIGNIFICANT CHANGE UP (ref 0–6)
GLUCOSE SERPL-MCNC: 108 MG/DL — HIGH (ref 70–99)
HCT VFR BLD CALC: 36.7 % — SIGNIFICANT CHANGE UP (ref 34.5–45)
HGB BLD-MCNC: 12.4 G/DL — SIGNIFICANT CHANGE UP (ref 11.5–15.5)
IMM GRANULOCYTES NFR BLD AUTO: 0.2 % — SIGNIFICANT CHANGE UP (ref 0–1.5)
LYMPHOCYTES # BLD AUTO: 0.81 K/UL — LOW (ref 1–3.3)
LYMPHOCYTES # BLD AUTO: 14.4 % — SIGNIFICANT CHANGE UP (ref 13–44)
MCHC RBC-ENTMCNC: 31.9 PG — SIGNIFICANT CHANGE UP (ref 27–34)
MCHC RBC-ENTMCNC: 33.8 GM/DL — SIGNIFICANT CHANGE UP (ref 32–36)
MCV RBC AUTO: 94.3 FL — SIGNIFICANT CHANGE UP (ref 80–100)
MONOCYTES # BLD AUTO: 0.28 K/UL — SIGNIFICANT CHANGE UP (ref 0–0.9)
MONOCYTES NFR BLD AUTO: 5 % — SIGNIFICANT CHANGE UP (ref 2–14)
NEUTROPHILS # BLD AUTO: 4.43 K/UL — SIGNIFICANT CHANGE UP (ref 1.8–7.4)
NEUTROPHILS NFR BLD AUTO: 78.8 % — HIGH (ref 43–77)
NRBC # BLD: 0 /100 WBCS — SIGNIFICANT CHANGE UP (ref 0–0)
PLATELET # BLD AUTO: 227 K/UL — SIGNIFICANT CHANGE UP (ref 150–400)
POTASSIUM SERPL-MCNC: 4.6 MMOL/L — SIGNIFICANT CHANGE UP (ref 3.5–5.3)
POTASSIUM SERPL-SCNC: 4.6 MMOL/L — SIGNIFICANT CHANGE UP (ref 3.5–5.3)
PROT SERPL-MCNC: 7 G/DL — SIGNIFICANT CHANGE UP (ref 6–8.3)
RBC # BLD: 3.89 M/UL — SIGNIFICANT CHANGE UP (ref 3.8–5.2)
RBC # FLD: 12.3 % — SIGNIFICANT CHANGE UP (ref 10.3–14.5)
SODIUM SERPL-SCNC: 136 MMOL/L — SIGNIFICANT CHANGE UP (ref 135–145)
WBC # BLD: 5.62 K/UL — SIGNIFICANT CHANGE UP (ref 3.8–10.5)
WBC # FLD AUTO: 5.62 K/UL — SIGNIFICANT CHANGE UP (ref 3.8–10.5)

## 2021-09-27 PROCEDURE — 80053 COMPREHEN METABOLIC PANEL: CPT

## 2021-09-27 PROCEDURE — 85027 COMPLETE CBC AUTOMATED: CPT

## 2021-09-27 PROCEDURE — 36415 COLL VENOUS BLD VENIPUNCTURE: CPT

## 2021-09-27 PROCEDURE — 99213 OFFICE O/P EST LOW 20 MIN: CPT

## 2021-09-27 NOTE — REVIEW OF SYSTEMS
[Fever] : no fever [Fatigue] : no fatigue [Recent Change In Weight] : ~T no recent weight change [Dysphagia] : no dysphagia [Odynophagia] : no odynophagia [Chest Pain] : no chest pain [Shortness Of Breath] : no shortness of breath [Abdominal Pain] : no abdominal pain [Skin Rash] : skin rash [Anxiety] : no anxiety [Depression] : no depression [Easy Bleeding] : no tendency for easy bleeding [Easy Bruising] : no tendency for easy bruising [Swollen Glands] : no swollen glands [de-identified] : left breast, improving

## 2021-09-27 NOTE — RESULTS/DATA
[FreeTextEntry1] : Ms. Che is a pleasant 55 year-old woman with recently diagnosed [id number] ER/N[place]+, Her2- left breast cancer status-post bilateral mastectomy and sentinel lymph node biopsy, now s/p RT, here in follow-up.

## 2021-09-27 NOTE — REASON FOR VISIT
Regarding: Adding a test for lab work prior to 19 Oct appointment  Contact: [phone]  ----- Message from Leigh Ann  sent at 9/26/2018 11:30 AM CDT -----    Good morning, recently my mom had her magnesium tablets cut back from 3 a day to 2 a day by Dr. Loomis.  She will have labs done on 12 October and was just wondering of the magnesium test can be added to see where the levels are now.  Any questions please call me.    Thanks,   Kenn Sumner  [phone]   [Follow-Up Visit] : a follow-up [FreeTextEntry2] : Breast Cancer

## 2021-09-27 NOTE — PHYSICAL EXAM
[Fully active, able to carry on all pre-disease performance without restriction] : Status 0 - Fully active, able to carry on all pre-disease performance without restriction [Normal] : no peripheral adenopathy appreciated [de-identified] : anicteric [de-identified] : b/l mastectomies with TE in place. Left breast erythematous with mild scaling. No nodularity, open wounds or drainage

## 2021-09-27 NOTE — HISTORY OF PRESENT ILLNESS
[de-identified] : Ms. Che was diagnosed with left breast ER/IA+, Her2 negative invasive ductal carcinoma on ultrasound-guided biopsy, which was completed on April 22, 2021.  Core biopsy was ER/IA+, Her2 negative by [facility]. \par \par Emily underwent bilateral mastectomy (right was prophylactic) with sentinel node biopsy and reconstruction with tissue expanders on May, 21, 2021. Final surgical pathology revealed left invasive ductal carcinoma (Grade 2), ER/IA+, Her2-, measuring 2.5 x 1.8 x 1.5cm. The anterior margin was positive. [place] lymph node was negative. There was no evidence of DCIS or LCIS. [id number].\par \par TOncotype testing revealed a recurrence score of 11.  She is post-menopausal. Myriad testing was negative.\par  [de-identified] : Emily completed adjuvant radiation to the left breast with Dr. Kelley in mid-September. She developed some skin erythema and breakdown, but overall did very well. She has intentionally been losing weight, reports wanting to lose an additional 10lbs. Seeing Dr. Herrera tomorrow for discussions regarding replacing left tissue expander.

## 2021-09-28 ENCOUNTER — APPOINTMENT (OUTPATIENT)
Dept: PLASTIC SURGERY | Facility: CLINIC | Age: 56
End: 2021-09-28
Payer: COMMERCIAL

## 2021-09-28 VITALS
WEIGHT: 134 LBS | HEIGHT: 63 IN | DIASTOLIC BLOOD PRESSURE: 76 MMHG | HEART RATE: 64 BPM | TEMPERATURE: 98 F | BODY MASS INDEX: 23.74 KG/M2 | RESPIRATION RATE: 15 BRPM | SYSTOLIC BLOOD PRESSURE: 122 MMHG | OXYGEN SATURATION: 97 %

## 2021-09-28 PROCEDURE — 99215 OFFICE O/P EST HI 40 MIN: CPT

## 2021-09-29 NOTE — REASON FOR VISIT
[Follow-Up: _____] : a [unfilled] follow-up visit [FreeTextEntry1] : discuss next phase of surgery s/p B/L capsulectomy with implant removal / immediate B/L breast reconstruction with TE done 5/21/21.  Last RT was 9/14/21.

## 2021-09-29 NOTE — PHYSICAL EXAM
[NI] : Normal [de-identified] : NL respiratory effort noted  [de-identified] : Bilateral Douglas in place.\par Left side is deflated. Skin shows erythema from radiation. No open wounds. Scars are stiff.\par Right side remains full with a good contour.\par Lateral chest wall dog ears, left greater than right.

## 2021-09-29 NOTE — HISTORY OF PRESENT ILLNESS
[FreeTextEntry1] : Pt finished radiation on 9/14/21 and reports skin is improving.\par Anxious to remove her deflated expander.\par She has been exercising and adjusting her diet and has lost 30 pounds intentionally, and she is feeling better about that.\par She would like to have surgery as soon as possible, but is also considering traveling for her daughter's birthday the weekend of November 4th.
Self

## 2021-09-29 NOTE — REVIEW OF SYSTEMS
[Recent Weight Loss (___ Lbs)] : recent [unfilled] ~Ulb weight loss [As Noted in HPI] : as noted in HPI [Negative] : Cardiovascular [FreeTextEntry2] : intentional wt loss

## 2021-09-29 NOTE — ASSESSMENT
[FreeTextEntry1] : Left tissue expander rupture.\par Will plan to replace the tissue expander as soon as the skin has had a chance to recover from radiation.\par I will plan to excise some of the stiff scar from the site of the previous open wound as well, capsulotomies as needed.\par Reviewed the R/B/A with the pt including infection requiring removal, exposure of the implant and poor contour.\par We specifically discussed the difficulties of expanding the lower pole in this situation and the potential for additional surgery for fat grafting, scar release etc to try to match the other side.\par She reported her understanding.

## 2021-10-08 ENCOUNTER — OUTPATIENT (OUTPATIENT)
Dept: OUTPATIENT SERVICES | Facility: HOSPITAL | Age: 56
LOS: 1 days | End: 2021-10-08

## 2021-10-13 ENCOUNTER — APPOINTMENT (OUTPATIENT)
Dept: PLASTIC SURGERY | Facility: CLINIC | Age: 56
End: 2021-10-13
Payer: COMMERCIAL

## 2021-10-13 VITALS
RESPIRATION RATE: 16 BRPM | DIASTOLIC BLOOD PRESSURE: 68 MMHG | OXYGEN SATURATION: 98 % | HEIGHT: 63 IN | WEIGHT: 131 LBS | TEMPERATURE: 98.2 F | HEART RATE: 66 BPM | BODY MASS INDEX: 23.21 KG/M2 | SYSTOLIC BLOOD PRESSURE: 116 MMHG

## 2021-10-13 DIAGNOSIS — T85.43XD LEAKAGE OF BREAST PROSTHESIS AND IMPLANT, SUBSEQUENT ENCOUNTER: ICD-10-CM

## 2021-10-13 PROCEDURE — 99213 OFFICE O/P EST LOW 20 MIN: CPT

## 2021-10-13 NOTE — ASSESSMENT
[FreeTextEntry1] : Left tissue expander rupture.\par Will plan to replace the tissue expander Scheduled for 10/22/21.\par I will plan to excise some of the stiff scar from the site of the previous open wound as well, capsulotomies as needed.\par Reviewed the R/B/A with the pt including infection requiring removal, exposure of the implant and poor contour.\par We specifically discussed the difficulties of expanding the lower pole in this situation and the potential for additional surgery for fat grafting, scar release etc to try to match the other side.\par Radiated skin has improved significantly.\par Will proceed with the current plan.\par \par

## 2021-10-13 NOTE — REASON FOR VISIT
[Follow-Up: _____] : a [unfilled] follow-up visit [FreeTextEntry1] : the pt is here for a skin prior to having a tissue expander replaced on 10/22/21, no current pain in the area

## 2021-10-13 NOTE — REVIEW OF SYSTEMS
[As Noted in HPI] : as noted in HPI [Recent Weight Loss (___ Lbs)] : recent [unfilled] ~Ulb weight loss [Negative] : Respiratory [FreeTextEntry2] : Has lost 40 pounds since May, intentionally. [FreeTextEntry6] : Interested in getting her COVID vaccination (on hold during her treatment). [de-identified] : Did not start anastrozole yet.

## 2021-10-13 NOTE — PHYSICAL EXAM
[NI] : Normal [de-identified] : NL respiratory effort noted  [de-identified] : Right TE in place with good contour.\par Left TE in place, but deflated.\par Inferior pole scar has contracted, but is still firm, about 2cm in diameter.\par Radiated skin is much softer and more pliable.\par No erosions or excoriations. No blistering or open wounds.\par Lateral skin excess without change.

## 2021-10-13 NOTE — HISTORY OF PRESENT ILLNESS
[FreeTextEntry1] : Pt reports no change, except that she is in mourning due to her sister's recent death.\par No problem with left chest or skin.

## 2021-10-14 ENCOUNTER — NON-APPOINTMENT (OUTPATIENT)
Age: 56
End: 2021-10-14

## 2021-10-19 ENCOUNTER — APPOINTMENT (OUTPATIENT)
Dept: DISASTER EMERGENCY | Facility: CLINIC | Age: 56
End: 2021-10-19

## 2021-10-20 LAB — SARS-COV-2 N GENE NPH QL NAA+PROBE: NOT DETECTED

## 2021-10-22 ENCOUNTER — APPOINTMENT (OUTPATIENT)
Dept: PLASTIC SURGERY | Facility: HOSPITAL | Age: 56
End: 2021-10-22
Payer: COMMERCIAL

## 2021-10-22 ENCOUNTER — OUTPATIENT (OUTPATIENT)
Dept: OUTPATIENT SERVICES | Facility: HOSPITAL | Age: 56
LOS: 1 days | End: 2021-10-22

## 2021-10-22 PROCEDURE — 19370 REVJ PERI-IMPLT CAPSULE BRST: CPT | Mod: LT

## 2021-10-22 PROCEDURE — 19357 TISS XPNDR PLMT BRST RCNSTJ: CPT | Mod: LT

## 2021-10-23 ENCOUNTER — NON-APPOINTMENT (OUTPATIENT)
Age: 56
End: 2021-10-23

## 2021-10-26 ENCOUNTER — APPOINTMENT (OUTPATIENT)
Dept: PLASTIC SURGERY | Facility: CLINIC | Age: 56
End: 2021-10-26
Payer: COMMERCIAL

## 2021-10-26 VITALS
TEMPERATURE: 96.8 F | SYSTOLIC BLOOD PRESSURE: 148 MMHG | DIASTOLIC BLOOD PRESSURE: 82 MMHG | HEIGHT: 63 IN | OXYGEN SATURATION: 98 % | WEIGHT: 129 LBS | BODY MASS INDEX: 22.86 KG/M2 | HEART RATE: 93 BPM

## 2021-10-26 PROCEDURE — 99024 POSTOP FOLLOW-UP VISIT: CPT

## 2021-10-26 NOTE — PHYSICAL EXAM
[NI] : Normal [de-identified] : NL respiratory effort noted  [de-identified] : Right TE in place without change.\par Left TE in place with good initial expansion. Steristrips in place. No oozing, no erythema, no ischemic changes.\par No palpable seroma.

## 2021-10-26 NOTE — HISTORY OF PRESENT ILLNESS
[FreeTextEntry1] : Pt doing well postop. Feels it's "too good to be true" since she doesn't have any pain.

## 2021-10-26 NOTE — PHYSICAL EXAM
[NI] : Normal [de-identified] : NL respiratory effort noted  [de-identified] : Right TE in place without change.\par Left TE in place with good initial expansion. Steristrips in place. No oozing, no erythema, no ischemic changes.\par No palpable seroma.

## 2021-10-26 NOTE — ASSESSMENT
[FreeTextEntry1] : Doing well postop\par Tissue expander filled to 200ml in the OR on 10/22/21. Next fill planned for next week.\par Plan weekly fills.

## 2021-10-28 ENCOUNTER — RX RENEWAL (OUTPATIENT)
Age: 56
End: 2021-10-28

## 2021-11-03 ENCOUNTER — APPOINTMENT (OUTPATIENT)
Dept: PLASTIC SURGERY | Facility: CLINIC | Age: 56
End: 2021-11-03
Payer: COMMERCIAL

## 2021-11-03 VITALS
WEIGHT: 126 LBS | OXYGEN SATURATION: 98 % | HEART RATE: 56 BPM | SYSTOLIC BLOOD PRESSURE: 120 MMHG | RESPIRATION RATE: 16 BRPM | TEMPERATURE: 97.6 F | BODY MASS INDEX: 22.32 KG/M2 | DIASTOLIC BLOOD PRESSURE: 76 MMHG | HEIGHT: 63 IN

## 2021-11-03 PROCEDURE — 99024 POSTOP FOLLOW-UP VISIT: CPT

## 2021-11-03 RX ORDER — OXYCODONE 5 MG/1
5 TABLET ORAL
Qty: 10 | Refills: 0 | Status: DISCONTINUED | COMMUNITY
Start: 2021-05-18 | End: 2021-11-03

## 2021-11-03 RX ORDER — CLINDAMYCIN HYDROCHLORIDE 300 MG/1
300 CAPSULE ORAL
Qty: 21 | Refills: 0 | Status: DISCONTINUED | COMMUNITY
Start: 2021-05-18 | End: 2021-11-03

## 2021-11-03 NOTE — PHYSICAL EXAM
[de-identified] : NL respiratory effort noted  [de-identified] : Left recon breast healing well\par Steristrips removed.\par After alcohol prep, magnet localization and CHG prep, 50ml fill performed without difficulty. Pt tolerated well.\par Right TE in place. No changes. [de-identified] : No breasts, s/p bilateral mastectomy.

## 2021-11-03 NOTE — REASON FOR VISIT
[Post Op: _________] : a [unfilled] post op visit [FreeTextEntry1] : S/P tissue expander replacement 10/22/21.doing well and has no new complaints, no pain meds needed

## 2021-11-03 NOTE — ASSESSMENT
[FreeTextEntry1] : Doing well postop.\par The TE was filled with 200 cc in the OR on 10/22/21. Today, 11/3 50 cc fill placed for a total of 250 cc.\par Follow up weekly.

## 2021-11-09 ENCOUNTER — APPOINTMENT (OUTPATIENT)
Dept: PLASTIC SURGERY | Facility: CLINIC | Age: 56
End: 2021-11-09
Payer: COMMERCIAL

## 2021-11-09 ENCOUNTER — OUTPATIENT (OUTPATIENT)
Dept: OUTPATIENT SERVICES | Facility: HOSPITAL | Age: 56
LOS: 1 days | End: 2021-11-09
Payer: COMMERCIAL

## 2021-11-09 ENCOUNTER — RESULT REVIEW (OUTPATIENT)
Age: 56
End: 2021-11-09

## 2021-11-09 ENCOUNTER — APPOINTMENT (OUTPATIENT)
Dept: HEMATOLOGY ONCOLOGY | Facility: CLINIC | Age: 56
End: 2021-11-09
Payer: COMMERCIAL

## 2021-11-09 VITALS
TEMPERATURE: 98 F | BODY MASS INDEX: 21.79 KG/M2 | HEART RATE: 62 BPM | DIASTOLIC BLOOD PRESSURE: 89 MMHG | HEIGHT: 63 IN | SYSTOLIC BLOOD PRESSURE: 138 MMHG | WEIGHT: 123 LBS | OXYGEN SATURATION: 97 %

## 2021-11-09 VITALS
WEIGHT: 123.02 LBS | HEART RATE: 62 BPM | OXYGEN SATURATION: 97 % | DIASTOLIC BLOOD PRESSURE: 89 MMHG | TEMPERATURE: 98 F | HEIGHT: 63 IN | SYSTOLIC BLOOD PRESSURE: 138 MMHG | BODY MASS INDEX: 21.8 KG/M2

## 2021-11-09 DIAGNOSIS — C50.919 MALIGNANT NEOPLASM OF UNSPECIFIED SITE OF UNSPECIFIED FEMALE BREAST: ICD-10-CM

## 2021-11-09 LAB
BASOPHILS # BLD AUTO: 0.02 K/UL — SIGNIFICANT CHANGE UP (ref 0–0.2)
BASOPHILS NFR BLD AUTO: 0.3 % — SIGNIFICANT CHANGE UP (ref 0–2)
EOSINOPHIL # BLD AUTO: 0.02 K/UL — SIGNIFICANT CHANGE UP (ref 0–0.5)
EOSINOPHIL NFR BLD AUTO: 0.3 % — SIGNIFICANT CHANGE UP (ref 0–6)
HCT VFR BLD CALC: 38.7 % — SIGNIFICANT CHANGE UP (ref 34.5–45)
HGB BLD-MCNC: 12.9 G/DL — SIGNIFICANT CHANGE UP (ref 11.5–15.5)
IMM GRANULOCYTES NFR BLD AUTO: 0.2 % — SIGNIFICANT CHANGE UP (ref 0–1.5)
LYMPHOCYTES # BLD AUTO: 1.05 K/UL — SIGNIFICANT CHANGE UP (ref 1–3.3)
LYMPHOCYTES # BLD AUTO: 16.6 % — SIGNIFICANT CHANGE UP (ref 13–44)
MCHC RBC-ENTMCNC: 31.9 PG — SIGNIFICANT CHANGE UP (ref 27–34)
MCHC RBC-ENTMCNC: 33.3 GM/DL — SIGNIFICANT CHANGE UP (ref 32–36)
MCV RBC AUTO: 95.6 FL — SIGNIFICANT CHANGE UP (ref 80–100)
MONOCYTES # BLD AUTO: 0.33 K/UL — SIGNIFICANT CHANGE UP (ref 0–0.9)
MONOCYTES NFR BLD AUTO: 5.2 % — SIGNIFICANT CHANGE UP (ref 2–14)
NEUTROPHILS # BLD AUTO: 4.9 K/UL — SIGNIFICANT CHANGE UP (ref 1.8–7.4)
NEUTROPHILS NFR BLD AUTO: 77.4 % — HIGH (ref 43–77)
NRBC # BLD: 0 /100 WBCS — SIGNIFICANT CHANGE UP (ref 0–0)
PLATELET # BLD AUTO: 277 K/UL — SIGNIFICANT CHANGE UP (ref 150–400)
RBC # BLD: 4.05 M/UL — SIGNIFICANT CHANGE UP (ref 3.8–5.2)
RBC # FLD: 12.4 % — SIGNIFICANT CHANGE UP (ref 10.3–14.5)
WBC # BLD: 6.33 K/UL — SIGNIFICANT CHANGE UP (ref 3.8–10.5)
WBC # FLD AUTO: 6.33 K/UL — SIGNIFICANT CHANGE UP (ref 3.8–10.5)

## 2021-11-09 PROCEDURE — 99213 OFFICE O/P EST LOW 20 MIN: CPT

## 2021-11-09 PROCEDURE — 80053 COMPREHEN METABOLIC PANEL: CPT

## 2021-11-09 PROCEDURE — 85027 COMPLETE CBC AUTOMATED: CPT

## 2021-11-09 PROCEDURE — 99024 POSTOP FOLLOW-UP VISIT: CPT

## 2021-11-09 NOTE — HISTORY OF PRESENT ILLNESS
[de-identified] : Ms. Che was diagnosed with left breast ER/ND+, Her2 negative invasive ductal carcinoma on ultrasound-guided biopsy, which was completed on April 22, 2021.  Core biopsy was ER/ND+, Her2 negative by [facility]. \par \par Emily underwent bilateral mastectomy (right was prophylactic) with sentinel node biopsy and reconstruction with tissue expanders on May, 21, 2021. Final surgical pathology revealed left invasive ductal carcinoma (Grade 2), ER/ND+, Her2-, measuring 2.5 x 1.8 x 1.5cm. The anterior margin was positive. [place] lymph node was negative. There was no evidence of DCIS or LCIS. [id number].\par \par Oncotype testing revealed a recurrence score of 11.  She is post-menopausal. Myriad testing was negative.\par \par Emily completed adjuvant radiation to the left breast with Dr. Kelley in mid-September. She developed some skin erythema and breakdown, but overall did very well. [de-identified] : Emily has had her left tissue expander replaced, going through slow expansion with Dr. Herrera. She has reached her goal weight loss.\par \par She has been on anastrozole for 2 weeks, noting no side effects.\par \par She denies any ongoing fevers or chills, no headache, no lumps or bumps, no bleeding or bruising.\par

## 2021-11-09 NOTE — ASSESSMENT
[FreeTextEntry1] : Doing well postop\par Will monitor SQ mass. Suspect small hematoma vs fat necrosis from previous surgery.\par The TE was filled with 200 cc in the OR on 10/22/21. Today, 11/9 50cc fill placed, 11/3 50 cc fill placed for a total of 300 cc.\par Follow up weekly. \par Path negative for malignancy. Pt notified.\par

## 2021-11-09 NOTE — PHYSICAL EXAM
[Fully active, able to carry on all pre-disease performance without restriction] : Status 0 - Fully active, able to carry on all pre-disease performance without restriction [Normal] : affect appropriate [de-identified] : anicteric [de-identified] : b/l mastectomies with TE in place. mild asymmetry, R larger than L. No nodularity, open wounds or drainage, no erythema

## 2021-11-09 NOTE — PHYSICAL EXAM
[NI] : Normal [de-identified] : NL respiratory effort noted  [de-identified] : Left recon breast tolerating expansion well.\par Palpable extruded suture at T-junction.\par No inflammation or erythema.\par No open wound.\par After alcohol prep, magnet localization and CHG prep, 50ml fill performed without difficulty. Pt tolerated well.\par Some bleeding noted. Stopped with pressure.\par 1.5cm mobile SQ mass palpable at injection site.\par

## 2021-11-09 NOTE — RESULTS/DATA
[FreeTextEntry1] : Ms. Che is a pleasant 56 year-old woman with [id number] ER/WV+, Her2- left breast cancer status-post bilateral mastectomy and sentinel lymph node biopsy, now s/p RT, here in follow-up on anastrozole.

## 2021-11-10 LAB
ALBUMIN SERPL ELPH-MCNC: 4.9 G/DL — SIGNIFICANT CHANGE UP (ref 3.3–5)
ALP SERPL-CCNC: 88 U/L — SIGNIFICANT CHANGE UP (ref 40–120)
ALT FLD-CCNC: 8 U/L — LOW (ref 10–45)
ANION GAP SERPL CALC-SCNC: 14 MMOL/L — SIGNIFICANT CHANGE UP (ref 5–17)
AST SERPL-CCNC: 23 U/L — SIGNIFICANT CHANGE UP (ref 10–40)
BILIRUB SERPL-MCNC: 0.3 MG/DL — SIGNIFICANT CHANGE UP (ref 0.2–1.2)
BUN SERPL-MCNC: 7 MG/DL — SIGNIFICANT CHANGE UP (ref 7–23)
CALCIUM SERPL-MCNC: 10.4 MG/DL — SIGNIFICANT CHANGE UP (ref 8.4–10.5)
CHLORIDE SERPL-SCNC: 99 MMOL/L — SIGNIFICANT CHANGE UP (ref 96–108)
CO2 SERPL-SCNC: 25 MMOL/L — SIGNIFICANT CHANGE UP (ref 22–31)
CREAT SERPL-MCNC: 0.72 MG/DL — SIGNIFICANT CHANGE UP (ref 0.5–1.3)
GLUCOSE SERPL-MCNC: 95 MG/DL — SIGNIFICANT CHANGE UP (ref 70–99)
POTASSIUM SERPL-MCNC: 4.5 MMOL/L — SIGNIFICANT CHANGE UP (ref 3.5–5.3)
POTASSIUM SERPL-SCNC: 4.5 MMOL/L — SIGNIFICANT CHANGE UP (ref 3.5–5.3)
PROT SERPL-MCNC: 7.1 G/DL — SIGNIFICANT CHANGE UP (ref 6–8.3)
SODIUM SERPL-SCNC: 138 MMOL/L — SIGNIFICANT CHANGE UP (ref 135–145)

## 2021-11-17 ENCOUNTER — APPOINTMENT (OUTPATIENT)
Dept: PLASTIC SURGERY | Facility: CLINIC | Age: 56
End: 2021-11-17
Payer: COMMERCIAL

## 2021-11-17 VITALS
OXYGEN SATURATION: 99 % | RESPIRATION RATE: 16 BRPM | HEART RATE: 62 BPM | WEIGHT: 123 LBS | TEMPERATURE: 98 F | BODY MASS INDEX: 21.79 KG/M2 | DIASTOLIC BLOOD PRESSURE: 70 MMHG | SYSTOLIC BLOOD PRESSURE: 128 MMHG | HEIGHT: 63 IN

## 2021-11-17 PROCEDURE — 99024 POSTOP FOLLOW-UP VISIT: CPT

## 2021-11-17 NOTE — HISTORY OF PRESENT ILLNESS
[FreeTextEntry1] : Pt reports the left TE "doesn't feel right". She says "I feel it", and she didn't really feel the right one or the previous left one as much.\par She also reports her skin is yellow.\par No pain, just discomfort, especially trying to roll over in bed.

## 2021-11-17 NOTE — ASSESSMENT
[FreeTextEntry1] : Doing well postop\par Suspect small hematoma on last visit leading to ecchymosis. Resolving now.\par The TE was filled with 200 cc in the OR on 10/22/21. Today, 11/16 50 cc fill placed, 11/9 50cc fill placed, 11/3 50 cc fill placed for a total of 350 cc.\par Follow up weekly\par

## 2021-11-17 NOTE — PHYSICAL EXAM
[NI] : Normal [de-identified] : NL respiratory effort noted  [de-identified] : Left recon breast tolerating expansion well.\par Mild evolving ecchymosis medially (the yellow area pt was referring to).\par No inflammation or erythema.\par No open wound.\par After alcohol prep, magnet localization and CHG prep, 50ml fill performed without difficulty. Pt tolerated well.\par No bleeding noted. \par \par

## 2021-11-23 ENCOUNTER — APPOINTMENT (OUTPATIENT)
Dept: PLASTIC SURGERY | Facility: CLINIC | Age: 56
End: 2021-11-23
Payer: COMMERCIAL

## 2021-11-23 VITALS
WEIGHT: 123 LBS | BODY MASS INDEX: 21.79 KG/M2 | TEMPERATURE: 97.9 F | RESPIRATION RATE: 16 BRPM | DIASTOLIC BLOOD PRESSURE: 80 MMHG | HEART RATE: 59 BPM | SYSTOLIC BLOOD PRESSURE: 144 MMHG | OXYGEN SATURATION: 98 % | HEIGHT: 63 IN

## 2021-11-23 PROCEDURE — 99024 POSTOP FOLLOW-UP VISIT: CPT

## 2021-11-23 NOTE — REASON FOR VISIT
[Follow-Up: _____] : a [unfilled] follow-up visit [FreeTextEntry1] : the pt is here for a weekly tissue expander fill, she has no new complaints

## 2021-11-23 NOTE — REVIEW OF SYSTEMS
[Recent Weight Loss (___ Lbs)] : recent [unfilled] ~Ulb weight loss [FreeTextEntry2] : intentional weight loss, 47 lbs since her visits in the spring.

## 2021-11-23 NOTE — ASSESSMENT
[FreeTextEntry1] : Doing well postop.\par The left TE was filled with 200 cc in the OR on 10/22/21. Today, 11/23 50 cc fill placed 11/16 50 cc fill placed, 11/9 50cc fill placed, 11/3 50 cc fill placed for a total of 400 cc.\par The right side has 450 cc total.\par

## 2021-11-23 NOTE — PHYSICAL EXAM
[NI] : Normal [de-identified] : NL respiratory effort noted  [de-identified] : Left recon breast tolerating expansion well.\par Resolved ecchymosis \par No inflammation or erythema.\par No open wound.\par After alcohol prep, magnet localization and CHG prep, 50ml fill performed without difficulty. Pt tolerated well.\par Minor bleeding noted. Resolved with pressure.\par \par

## 2021-12-01 ENCOUNTER — APPOINTMENT (OUTPATIENT)
Dept: PLASTIC SURGERY | Facility: CLINIC | Age: 56
End: 2021-12-01
Payer: COMMERCIAL

## 2021-12-01 VITALS
BODY MASS INDEX: 21.79 KG/M2 | DIASTOLIC BLOOD PRESSURE: 78 MMHG | RESPIRATION RATE: 16 BRPM | SYSTOLIC BLOOD PRESSURE: 130 MMHG | HEART RATE: 69 BPM | OXYGEN SATURATION: 99 % | HEIGHT: 63 IN | WEIGHT: 123 LBS | TEMPERATURE: 97.2 F

## 2021-12-01 PROCEDURE — 99024 POSTOP FOLLOW-UP VISIT: CPT

## 2021-12-01 NOTE — HISTORY OF PRESENT ILLNESS
[FreeTextEntry1] : Pt has no new complaints. She did bruise again after the last fill.\par She is concerned that the left side is expanding more in the upper pole than the lower pole.

## 2021-12-01 NOTE — PHYSICAL EXAM
[NI] : Normal [de-identified] : Right TE in good position.\par Left TE expanding well. Still riding higher than the right, as expected.\par After alcohol prep, magnet localization and CHG prep, 50ml fill performed without difficulty. Pt tolerated well.

## 2021-12-01 NOTE — ASSESSMENT
[FreeTextEntry1] : Doing well postop.\par The left TE was filled with 200 cc in the OR on 10/22/21. Today, 12/1 50 cc fill placed, 11/23 50 cc fill placed 11/16 50 cc fill placed, 11/9 50cc fill placed, 11/3 50 cc fill placed for a total of 450 cc.\par The right side has 450 cc total.\par  Will stop expanding at this point since the volume is equal to the other side.\par Will have pt f/u in January and then plan implant exchange and fat grafting. \par Discussed the difficulty of achieving exact symmetry, especially with radiation, but symmetry can be improved.\par \par

## 2022-01-12 ENCOUNTER — APPOINTMENT (OUTPATIENT)
Dept: PLASTIC SURGERY | Facility: CLINIC | Age: 57
End: 2022-01-12
Payer: COMMERCIAL

## 2022-01-12 PROCEDURE — 99024 POSTOP FOLLOW-UP VISIT: CPT

## 2022-01-12 NOTE — HISTORY OF PRESENT ILLNESS
[FreeTextEntry1] : Pt feels well in general and reports she was really looking forward to this visit.\par She is anxious to get the tissue expander out and is ready for her final implants.\par The left side has always felt tighter and more uncomfortable and has a bump on the side near the axilla.

## 2022-01-12 NOTE — ASSESSMENT
[FreeTextEntry1] : Doing very well, with improved pliability of the tissues on the left.\par We will go ahead and pick a date for surgery for implant exchange. This could be late February or early March.\par Follow up in one month to  have an in person visit and the go over the silicone implant consent.\par

## 2022-01-12 NOTE — PHYSICAL EXAM
[NI] : Normal [de-identified] : NL respiratory effort noted  [de-identified] : bilateral tissue expanders in place.\par The right appears softer and more mobile.\par The skin on the left is much more pliable now, and the pt is able to slide the skin around over the expander.\par The contour is improved as well with the lower pole settling  much more than previously.\par There is an area that protrudes laterally that appears to be a fold of the expander.\par No erythema [de-identified] : No breasts, s/p bilateral mastectomy.

## 2022-02-03 ENCOUNTER — OUTPATIENT (OUTPATIENT)
Dept: OUTPATIENT SERVICES | Facility: HOSPITAL | Age: 57
LOS: 1 days | End: 2022-02-03
Payer: COMMERCIAL

## 2022-02-03 DIAGNOSIS — C50.919 MALIGNANT NEOPLASM OF UNSPECIFIED SITE OF UNSPECIFIED FEMALE BREAST: ICD-10-CM

## 2022-02-03 DIAGNOSIS — D64.9 ANEMIA, UNSPECIFIED: ICD-10-CM

## 2022-02-07 ENCOUNTER — RESULT REVIEW (OUTPATIENT)
Age: 57
End: 2022-02-07

## 2022-02-07 ENCOUNTER — APPOINTMENT (OUTPATIENT)
Dept: HEMATOLOGY ONCOLOGY | Facility: CLINIC | Age: 57
End: 2022-02-07
Payer: COMMERCIAL

## 2022-02-07 VITALS
BODY MASS INDEX: 21.61 KG/M2 | TEMPERATURE: 98 F | HEART RATE: 65 BPM | OXYGEN SATURATION: 99 % | SYSTOLIC BLOOD PRESSURE: 153 MMHG | DIASTOLIC BLOOD PRESSURE: 88 MMHG | WEIGHT: 121.98 LBS

## 2022-02-07 LAB
ALBUMIN SERPL ELPH-MCNC: 4.9 G/DL — SIGNIFICANT CHANGE UP (ref 3.3–5)
ALP SERPL-CCNC: 86 U/L — SIGNIFICANT CHANGE UP (ref 40–120)
ALT FLD-CCNC: 11 U/L — SIGNIFICANT CHANGE UP (ref 10–45)
ANION GAP SERPL CALC-SCNC: 14 MMOL/L — SIGNIFICANT CHANGE UP (ref 5–17)
AST SERPL-CCNC: 25 U/L — SIGNIFICANT CHANGE UP (ref 10–40)
BASOPHILS # BLD AUTO: 0.03 K/UL — SIGNIFICANT CHANGE UP (ref 0–0.2)
BASOPHILS NFR BLD AUTO: 0.5 % — SIGNIFICANT CHANGE UP (ref 0–2)
BILIRUB SERPL-MCNC: 0.5 MG/DL — SIGNIFICANT CHANGE UP (ref 0.2–1.2)
BUN SERPL-MCNC: 14 MG/DL — SIGNIFICANT CHANGE UP (ref 7–23)
CALCIUM SERPL-MCNC: 10.2 MG/DL — SIGNIFICANT CHANGE UP (ref 8.4–10.5)
CHLORIDE SERPL-SCNC: 102 MMOL/L — SIGNIFICANT CHANGE UP (ref 96–108)
CO2 SERPL-SCNC: 23 MMOL/L — SIGNIFICANT CHANGE UP (ref 22–31)
CREAT SERPL-MCNC: 0.66 MG/DL — SIGNIFICANT CHANGE UP (ref 0.5–1.3)
EOSINOPHIL # BLD AUTO: 0.03 K/UL — SIGNIFICANT CHANGE UP (ref 0–0.5)
EOSINOPHIL NFR BLD AUTO: 0.5 % — SIGNIFICANT CHANGE UP (ref 0–6)
GLUCOSE SERPL-MCNC: 94 MG/DL — SIGNIFICANT CHANGE UP (ref 70–99)
HCT VFR BLD CALC: 39 % — SIGNIFICANT CHANGE UP (ref 34.5–45)
HGB BLD-MCNC: 13 G/DL — SIGNIFICANT CHANGE UP (ref 11.5–15.5)
IMM GRANULOCYTES NFR BLD AUTO: 0.2 % — SIGNIFICANT CHANGE UP (ref 0–1.5)
LYMPHOCYTES # BLD AUTO: 1.3 K/UL — SIGNIFICANT CHANGE UP (ref 1–3.3)
LYMPHOCYTES # BLD AUTO: 22.2 % — SIGNIFICANT CHANGE UP (ref 13–44)
MCHC RBC-ENTMCNC: 32.7 PG — SIGNIFICANT CHANGE UP (ref 27–34)
MCHC RBC-ENTMCNC: 33.3 GM/DL — SIGNIFICANT CHANGE UP (ref 32–36)
MCV RBC AUTO: 98 FL — SIGNIFICANT CHANGE UP (ref 80–100)
MONOCYTES # BLD AUTO: 0.37 K/UL — SIGNIFICANT CHANGE UP (ref 0–0.9)
MONOCYTES NFR BLD AUTO: 6.3 % — SIGNIFICANT CHANGE UP (ref 2–14)
NEUTROPHILS # BLD AUTO: 4.12 K/UL — SIGNIFICANT CHANGE UP (ref 1.8–7.4)
NEUTROPHILS NFR BLD AUTO: 70.3 % — SIGNIFICANT CHANGE UP (ref 43–77)
NRBC # BLD: 0 /100 WBCS — SIGNIFICANT CHANGE UP (ref 0–0)
PLATELET # BLD AUTO: 227 K/UL — SIGNIFICANT CHANGE UP (ref 150–400)
POTASSIUM SERPL-MCNC: 4.4 MMOL/L — SIGNIFICANT CHANGE UP (ref 3.5–5.3)
POTASSIUM SERPL-SCNC: 4.4 MMOL/L — SIGNIFICANT CHANGE UP (ref 3.5–5.3)
PROT SERPL-MCNC: 7.4 G/DL — SIGNIFICANT CHANGE UP (ref 6–8.3)
RBC # BLD: 3.98 M/UL — SIGNIFICANT CHANGE UP (ref 3.8–5.2)
RBC # FLD: 12.1 % — SIGNIFICANT CHANGE UP (ref 10.3–14.5)
SODIUM SERPL-SCNC: 139 MMOL/L — SIGNIFICANT CHANGE UP (ref 135–145)
WBC # BLD: 5.86 K/UL — SIGNIFICANT CHANGE UP (ref 3.8–10.5)
WBC # FLD AUTO: 5.86 K/UL — SIGNIFICANT CHANGE UP (ref 3.8–10.5)

## 2022-02-07 PROCEDURE — 85027 COMPLETE CBC AUTOMATED: CPT

## 2022-02-07 PROCEDURE — 80053 COMPREHEN METABOLIC PANEL: CPT

## 2022-02-07 PROCEDURE — 99213 OFFICE O/P EST LOW 20 MIN: CPT

## 2022-02-07 NOTE — RESULTS/DATA
[FreeTextEntry1] : Ms. Che is a pleasant 56 year-old woman with [id number] ER/[place]+, Her2- left breast cancer status-post bilateral mastectomy and sentinel lymph node biopsy, now s/p RT, here in follow-up on anastrozole.

## 2022-02-07 NOTE — HISTORY OF PRESENT ILLNESS
[de-identified] : Ms. Che was diagnosed with left breast ER/MD+, Her2 negative invasive ductal carcinoma on ultrasound-guided biopsy, which was completed on April 22, 2021.  Core biopsy was ER/MD+, Her2 negative by [facility]. \par \par Emily underwent bilateral mastectomy (right was prophylactic) with sentinel node biopsy and reconstruction with tissue expanders on May, 21, 2021. Final surgical pathology revealed left invasive ductal carcinoma (Grade 2), ER/MD+, Her2-, measuring 2.5 x 1.8 x 1.5cm. The anterior margin was positive. [place] lymph node was negative. There was no evidence of DCIS or LCIS. [id number].\par \par Oncotype testing revealed a recurrence score of 11.  She is post-menopausal. Myriad testing was negative.\par \par Emily completed adjuvant radiation to the left breast with Dr. Kelley in mid-September. She developed some skin erythema and breakdown, but overall did very well. [de-identified] : Pending bilateral implant placement 2/17/22. \par \par No issues on anastrozole. Some hot flashes, but not dose-limiting. No arthralgias. Weight is stable. She denies any ongoing fevers or chills, no headache, no lumps or bumps, no bleeding or bruising.\par

## 2022-02-07 NOTE — REVIEW OF SYSTEMS
[Fever] : no fever [Fatigue] : no fatigue [Recent Change In Weight] : ~T no recent weight change [Dysphagia] : no dysphagia [Odynophagia] : no odynophagia [Chest Pain] : no chest pain [Shortness Of Breath] : no shortness of breath [Abdominal Pain] : no abdominal pain [Joint Pain] : no joint pain [Joint Stiffness] : no joint stiffness [Skin Rash] : no skin rash [Anxiety] : no anxiety [Depression] : no depression [Easy Bleeding] : no tendency for easy bleeding [Easy Bruising] : no tendency for easy bruising [Swollen Glands] : no swollen glands

## 2022-02-07 NOTE — PHYSICAL EXAM
[Fully active, able to carry on all pre-disease performance without restriction] : Status 0 - Fully active, able to carry on all pre-disease performance without restriction [Normal] : affect appropriate [de-identified] : anicteric [de-identified] : b/l mastectomies with TE in place. mild asymmetry, R larger than L. No nodularity, open wounds or drainage, no erythema

## 2022-02-10 ENCOUNTER — OUTPATIENT (OUTPATIENT)
Dept: OUTPATIENT SERVICES | Facility: HOSPITAL | Age: 57
LOS: 1 days | End: 2022-02-10

## 2022-02-11 ENCOUNTER — APPOINTMENT (OUTPATIENT)
Dept: PLASTIC SURGERY | Facility: CLINIC | Age: 57
End: 2022-02-11
Payer: COMMERCIAL

## 2022-02-11 VITALS
RESPIRATION RATE: 15 BRPM | HEART RATE: 62 BPM | OXYGEN SATURATION: 98 % | DIASTOLIC BLOOD PRESSURE: 86 MMHG | TEMPERATURE: 98 F | SYSTOLIC BLOOD PRESSURE: 140 MMHG | WEIGHT: 121 LBS | BODY MASS INDEX: 21.44 KG/M2 | HEIGHT: 63 IN

## 2022-02-11 DIAGNOSIS — Z01.812 ENCOUNTER FOR PREPROCEDURAL LABORATORY EXAMINATION: ICD-10-CM

## 2022-02-11 DIAGNOSIS — Z90.13 ACQUIRED ABSENCE OF BILATERAL BREASTS AND NIPPLES: ICD-10-CM

## 2022-02-11 DIAGNOSIS — Z42.1 ENCOUNTER FOR BREAST RECONSTRUCTION FOLLOWING MASTECTOMY: ICD-10-CM

## 2022-02-11 PROCEDURE — 99215 OFFICE O/P EST HI 40 MIN: CPT

## 2022-02-11 NOTE — HISTORY OF PRESENT ILLNESS
[FreeTextEntry1] : Pt presents for evaluation of her reconstruction prior to implant exchange.\par She reports she is happy to be ready to have the softer implants instead of the tissue expanders.\par No new complaints.

## 2022-02-11 NOTE — PHYSICAL EXAM
[NI] : Normal [de-identified] : NL respiratory effort noted  [de-identified] : Bilateral tissue expanders in place.\par Right TE lower medial corner is visible and palpable.\par Left TE with multiple folds visible and palpable.\par Soft tissue coverage much thicker on the right than the left.\par Left side contour has improved since the last visit though. [de-identified] : No breasts, s/p bilateral mastectomy.  [de-identified] : small amount of central adipose. Excellent muscle tone.\par Central skin laxity. [de-identified] : Moderate amount of medial thigh adipose available as well as lateral "saddlebag" deposits.\par Upper arm skin excess without significant adipose available.

## 2022-02-11 NOTE — ASSESSMENT
[FreeTextEntry1] : We discussed the next stage of reconstruction, which includes bilateral implant exchange (removing expanders and placing silicone implants) and fat grafting to improved the soft tissue coverage over the implants.\par Pt has lost 50 lbs since last Spring with diet and exercise and is in very good condition. There is still some fat available for harvest in the thighs and a small amount in the abdomen.\par \par We reviewed the risks, benefits and alternatives to fat grafting, including, but not limited to, infection of the implant requiring removal, fat necrosis resembling a breast mass and oil cysts. We also reviewed the soreness and bruising expected at the fat harvest (liposuction) sites. We reviewed the possibility of contour deformity at the harvest sites. I also discussed the timeline for fat graft take, and that it will take 2-3 months to see how much of the fat will live in its new location. Additional sessions of fat grafting may be required.\par \par We discussed implant risks and went over the [place] silicone implant consent checklist page by page. In summary, we discussed that implants are not lifetime devices. There are risks of capsular contracture (higher after radiation), implant rupture, breast implant associated lymphoma (CHRIS-ALCL), infection requiring removal and poor position and/or contour. We discussed systemic symptoms that may be associated with breast implants that may or may not resolve with removal.\par We discussed the difference between prepectoral and submuscular placement (hers are submuscular) and the risk of even thinner coverage if submuscular implants are converted to prepectoral.\par We briefly reviewed the NAC reconstruction with nipple creation (if there is adequate coverage and pliability on the left radiated side) and NAC tattoo. She may just be a candidate for "3D" nipple-areolar tattoo if the left side nipple recon is prohibitive, or if pt does not want projection.\par She was given an opportunity to ask questions, and all of her questions were answered. She wishes to proceed. She initialled and signed the implant consent for as well.

## 2022-02-14 ENCOUNTER — APPOINTMENT (OUTPATIENT)
Age: 57
End: 2022-02-14
Payer: COMMERCIAL

## 2022-02-14 VITALS
HEIGHT: 63 IN | SYSTOLIC BLOOD PRESSURE: 149 MMHG | WEIGHT: 121 LBS | DIASTOLIC BLOOD PRESSURE: 83 MMHG | TEMPERATURE: 97.9 F | HEART RATE: 65 BPM | BODY MASS INDEX: 21.44 KG/M2

## 2022-02-14 PROCEDURE — 93702 BIS XTRACELL FLUID ANALYSIS: CPT

## 2022-02-14 PROCEDURE — 99214 OFFICE O/P EST MOD 30 MIN: CPT | Mod: 25

## 2022-02-14 NOTE — PHYSICAL EXAM
[Normocephalic] : normocephalic [Atraumatic] : atraumatic [Supple] : supple [No Supraclavicular Adenopathy] : no supraclavicular adenopathy [No Thyromegaly] : no thyromegaly [Examined in the supine and seated position] : examined in the supine and seated position [No dominant masses] : no dominant masses in right breast  [No dominant masses] : no dominant masses left breast [No Nipple Retraction] : no left nipple retraction [No Nipple Discharge] : no left nipple discharge [No Axillary Lymphadenopathy] : no left axillary lymphadenopathy [No Edema] : no edema [No Rashes] : no rashes [No Ulceration] : no ulceration [de-identified] : Bilat mastectomy with TE. L EBRT changes. No lymphadenopathy. Full ROM and no LE noted on assessment.

## 2022-02-14 NOTE — PROCEDURE
[FreeTextEntry2] : lymphedema analysis [FreeTextEntry1] : Brunilda measurement [FreeTextEntry3] : The patient had a 6 month f/u SOZO measurement which I reviewed today. The score is within normal limits, see flowsheet. Bioimpedance spectroscopy helps identify the onset of lymphedema in an arm or leg before patients experience noticeable swelling. Research has shown that the early detection of lymphedema using L-Dex combined with treatment can reduce progression to chronic lymphedema by 95% in breast cancer patients. Whenever possible, patients are tested for baseline L-Dex score before cancer treatment begins and then are reassessed during regular follow-up visits using the SOZO device. Otherwise, this can be started postoperatively and continued during regular follow-up visits. If the patient’s L-Dex score increases above normal levels, that is a sign that lymphedema is developing and a referral is made to physical therapy for further evaluation and early compression treatment. Lymphedema assessment with the SOZO L-Dex score is recommended to be done every 3 months for the first 3 years and then every 6 months for years 4 and 5 followed by annually afterwards.\par

## 2022-02-14 NOTE — HISTORY OF PRESENT ILLNESS
[FreeTextEntry1] : Pt is a 56 year old MYRIAD negative white female here for follow up and for ? lymphedema.  Pt was doing Yoga yesterday and had pain in her forearm. She denies any fullness or tight feeling. She had some swelling when she took off her apple watch, but it has resolved today. \par  Pt is s/p 5/21/21 R prophylactic and L mastectomy w/ SLNbx and bilat recon w/ TE per Dr. Herrera for recent L U/S bx 4/22/21 showing IDC, ER/AL status + >95%, Her2 CISH negative.\par Oncotype 11, low.\par \par 5/21/21 Surgical PATH: R negative, L IDC mod diff, anterior margin + invasive ca, posterior margin 1.0mm from invasive ca, pT2N0/1Mx, measures 2.5x1.8x1.5cm, Grade 2, -DCIS, -LCIS, - LVI, focal ALH, FCC w/ apocrine metaplasia, ER+ >95%, AL+ >95%, Her2 CISH-\par PCP is Bethany Wright MD.\par \par Pt's left expander was deflated and had surgery w/ Dr. Herrera for TE replacement 10/22/21 - plan for surgery 2/17/22 for TE to implant exchange.\par \par Pt saw Parker for L arm cording w/ improvement immediately after, may still have some residual but pt no longer seeing him. \par Sees Dr. Colorado - taking Anastrazole\par Sees. Dr Kelley - Finished EBRT 9/21\par \par Fhx: Breast CA - mother 85, maunt-unknown ag at diagnosis. Not AJ. \par

## 2022-02-14 NOTE — ASSESSMENT
[FreeTextEntry1] : Pt is a 56 year old MYRIAD negative white female here for follow up and for ? lymphedema.  Pt was doing Yoga yesterday and had pain in her forearm. She denies any fullness or tight feeling. She had some swelling when she took off her apple watch, but it has resolved today. \par  Pt is s/p 5/21/21 R prophylactic and L mastectomy w/ SLNbx and bilat recon w/ TE per Dr. Herrera for recent L U/S bx 4/22/21 showing IDC, ER/OH status + >95%, Her2 CISH negative.\par Oncotype 11, low.\par \par 5/21/21 Surgical PATH: R negative, L IDC mod diff, anterior margin + invasive ca, posterior margin 1.0mm from invasive ca, pT2N0/1Mx, measures 2.5x1.8x1.5cm, Grade 2, -DCIS, -LCIS, - LVI, focal ALH, FCC w/ apocrine metaplasia, ER+ >95%, OH+ >95%, Her2 CISH-\par PCP is Bethany Wright MD.\par \par Pt's left expander was deflated and had surgery w/ Dr. Herrera for TE replacement 10/22/21 - plan for surgery 2/17/22 for TE to implant exchange.\par \par Pt saw Parker for L arm cording w/ improvement immediately after. \par Sees Dr. Colorado - taking Anastrazole\par Sees. Dr Kelley - Finished EBRT 9/21\par \par Fhx: Breast CA - mother 85, maunt-unknown ag at diagnosis. Not AJ. \par \par CBE: Bilat mastectomy with TE. L EBRT changes, erythema whole breast and minimal skin breakdown in L axilla area. No lymphadenopathy. Full ROM and no LE noted on assessment. \par Pt reassured no evidence of lymphedema, may have pulled a muscle. Discussed Lymphedema does not occur and resolve so quickly. SOZO today wnl. \par F/u w/ Dr. Herrera for surgery as scheduled. F/u w/ Dr. Kelley and Dr. Kellogg as scheduled - Continue anastrazole. F/u 6 mos. or sooner as needed.

## 2022-02-17 ENCOUNTER — APPOINTMENT (OUTPATIENT)
Dept: PLASTIC SURGERY | Facility: HOSPITAL | Age: 57
End: 2022-02-17
Payer: COMMERCIAL

## 2022-02-17 ENCOUNTER — OUTPATIENT (OUTPATIENT)
Dept: OUTPATIENT SERVICES | Facility: HOSPITAL | Age: 57
LOS: 1 days | End: 2022-02-17
Payer: COMMERCIAL

## 2022-02-17 PROCEDURE — 15771 GRFG AUTOL FAT LIPO 50 CC/<: CPT

## 2022-02-17 PROCEDURE — 15772 GRFG AUTOL FAT LIPO EA ADDL: CPT

## 2022-02-17 PROCEDURE — 88300 SURGICAL PATH GROSS: CPT | Mod: 26,59

## 2022-02-17 PROCEDURE — 19342 INSJ/RPLCMT BRST IMPLT SEP D: CPT | Mod: RT

## 2022-02-17 PROCEDURE — 88302 TISSUE EXAM BY PATHOLOGIST: CPT | Mod: 26

## 2022-02-22 DIAGNOSIS — I10 ESSENTIAL (PRIMARY) HYPERTENSION: ICD-10-CM

## 2022-02-22 DIAGNOSIS — N64.89 OTHER SPECIFIED DISORDERS OF BREAST: ICD-10-CM

## 2022-02-22 DIAGNOSIS — Z85.3 PERSONAL HISTORY OF MALIGNANT NEOPLASM OF BREAST: ICD-10-CM

## 2022-02-22 DIAGNOSIS — Z90.13 ACQUIRED ABSENCE OF BILATERAL BREASTS AND NIPPLES: ICD-10-CM

## 2022-03-01 ENCOUNTER — APPOINTMENT (OUTPATIENT)
Dept: PLASTIC SURGERY | Facility: CLINIC | Age: 57
End: 2022-03-01
Payer: COMMERCIAL

## 2022-03-01 VITALS
HEART RATE: 60 BPM | OXYGEN SATURATION: 99 % | SYSTOLIC BLOOD PRESSURE: 143 MMHG | DIASTOLIC BLOOD PRESSURE: 84 MMHG | TEMPERATURE: 97.1 F | RESPIRATION RATE: 15 BRPM | BODY MASS INDEX: 21.97 KG/M2 | WEIGHT: 124 LBS

## 2022-03-01 PROCEDURE — 99024 POSTOP FOLLOW-UP VISIT: CPT

## 2022-03-01 RX ORDER — OXYCODONE 5 MG/1
5 TABLET ORAL EVERY 6 HOURS
Qty: 5 | Refills: 0 | Status: DISCONTINUED | COMMUNITY
Start: 2022-02-11 | End: 2022-03-01

## 2022-03-01 RX ORDER — CLINDAMYCIN HYDROCHLORIDE 300 MG/1
300 CAPSULE ORAL
Qty: 21 | Refills: 0 | Status: DISCONTINUED | COMMUNITY
Start: 2022-02-11 | End: 2022-03-01

## 2022-03-01 NOTE — HISTORY OF PRESENT ILLNESS
[FreeTextEntry1] : Pt reports feeling well.\par She did have some stomach upset with the antibiotics, but that resolved when the course was completed.\par She does complain of some decreased range of motion of the the left arm and cording. She had been doing PT but had stopped. She saw Dr. Staley and PT was recommended, and she did make an appt. She is more concerned about lymphedema. SOZO was done at that visit on 2/14/22 and interpreted as normal.

## 2022-03-01 NOTE — ASSESSMENT
[FreeTextEntry1] : Doing well postop.\par I would recommend resuming PT for the cording as this can be improved and will improved the ROM as well.\par Will monitor postoperative changes and evaluate fat graft take.\par Follow up in 2 months\par Call for any questions or concerns.

## 2022-03-01 NOTE — PHYSICAL EXAM
[NI] : Normal [de-identified] : NL respiratory effort noted  [de-identified] : Bilateral implants in place.\par Contour is better than expanders. Some irregularity of fat grafts superiorly, bilaterally.\par incisions all healing well.\par No erythema.\par Nearly resolved edema.\par No evidence of lymphedema of left arm.\par There is cording of the left upper arm and pressure on the distal cord does reproduce the forearm pain. [de-identified] : No breasts, s/p bilateral mastectomy.  [de-identified] : Soft. excess skin without change. Some tethering on the right mid abd.\par Suture lines healing [de-identified] : Medial thighs: sutures have mostly sloughed. No residual ecchymosis.\par No contour irregularities.

## 2022-03-03 ENCOUNTER — OUTPATIENT (OUTPATIENT)
Dept: OUTPATIENT SERVICES | Facility: HOSPITAL | Age: 57
LOS: 1 days | End: 2022-03-03

## 2022-03-03 DIAGNOSIS — C50.212 MALIGNANT NEOPLASM OF UPPER-INNER QUADRANT OF LEFT FEMALE BREAST: ICD-10-CM

## 2022-03-07 ENCOUNTER — APPOINTMENT (OUTPATIENT)
Dept: BREAST CENTER | Facility: CLINIC | Age: 57
End: 2022-03-07

## 2022-04-27 ENCOUNTER — APPOINTMENT (OUTPATIENT)
Dept: PLASTIC SURGERY | Facility: CLINIC | Age: 57
End: 2022-04-27
Payer: COMMERCIAL

## 2022-04-27 VITALS
SYSTOLIC BLOOD PRESSURE: 130 MMHG | HEART RATE: 68 BPM | RESPIRATION RATE: 16 BRPM | OXYGEN SATURATION: 98 % | DIASTOLIC BLOOD PRESSURE: 80 MMHG | HEIGHT: 63 IN | TEMPERATURE: 98.2 F | WEIGHT: 123 LBS | BODY MASS INDEX: 21.79 KG/M2

## 2022-04-27 PROCEDURE — 99024 POSTOP FOLLOW-UP VISIT: CPT

## 2022-04-27 NOTE — HISTORY OF PRESENT ILLNESS
[FreeTextEntry1] : F/U on bi lateral breast reconstruction \par pt states no complaints and feels she is healing nicely

## 2022-04-27 NOTE — PHYSICAL EXAM
[de-identified] : Weight has been stable [de-identified] : NL respiratory effort noted  [de-identified] : No breasts, s/p bilateral mastectomy.  [de-identified] : Bilateral implants in place with fair to good symmetry.\par There is some flattening on the left, especially centrally and inferiorly, with the implant being displaced superiorly.\par The coverage in thin in the left lower pole.\par Tissue are softer over the left central aspect, and the skin is more mobile.

## 2022-04-27 NOTE — ASSESSMENT
[FreeTextEntry1] : Now two months s/p implant exchange and fat grafting.\par I recommend continuing to wait and allow the tissues to soften as much as possible before considering any revision to the left side. The results on the left will be limited by prior scarring from wound healing difficulties and by radiation.\par We discussed nipple creation (may be possible now that the tissue are softer on the left) and NAC tattoos.\par if there is consideration for revision of the left side, then the NAC recon should probably wait. She agrees with this plan. Follow up in 6 months.\par Call for any questions or concerns.

## 2022-04-27 NOTE — PHYSICAL EXAM
[de-identified] : Weight has been stable [de-identified] : NL respiratory effort noted  [de-identified] : Bilateral implants in place with fair to good symmetry.\par There is some flattening on the left, especially centrally and inferiorly, with the implant being displaced superiorly.\par The coverage in thin in the left lower pole.\par Tissue are softer over the left central aspect, and the skin is more mobile. [de-identified] : No breasts, s/p bilateral mastectomy.

## 2022-04-27 NOTE — REASON FOR VISIT
[FreeTextEntry1] : F/U on bi lateral breast reconstruction \par pt states no complaints and feels she is healing nicely.

## 2022-05-03 ENCOUNTER — OUTPATIENT (OUTPATIENT)
Dept: OUTPATIENT SERVICES | Facility: HOSPITAL | Age: 57
LOS: 1 days | End: 2022-05-03
Payer: COMMERCIAL

## 2022-05-03 DIAGNOSIS — C50.919 MALIGNANT NEOPLASM OF UNSPECIFIED SITE OF UNSPECIFIED FEMALE BREAST: ICD-10-CM

## 2022-05-04 ENCOUNTER — APPOINTMENT (OUTPATIENT)
Dept: HEMATOLOGY ONCOLOGY | Facility: CLINIC | Age: 57
End: 2022-05-04

## 2022-05-04 ENCOUNTER — RESULT REVIEW (OUTPATIENT)
Age: 57
End: 2022-05-04

## 2022-05-04 VITALS
WEIGHT: 122.8 LBS | DIASTOLIC BLOOD PRESSURE: 79 MMHG | HEIGHT: 63 IN | BODY MASS INDEX: 21.76 KG/M2 | HEART RATE: 67 BPM | SYSTOLIC BLOOD PRESSURE: 138 MMHG | TEMPERATURE: 97 F | RESPIRATION RATE: 16 BRPM

## 2022-05-04 LAB
BASOPHILS # BLD AUTO: 0.03 K/UL — SIGNIFICANT CHANGE UP (ref 0–0.2)
BASOPHILS NFR BLD AUTO: 0.4 % — SIGNIFICANT CHANGE UP (ref 0–2)
EOSINOPHIL # BLD AUTO: 0.08 K/UL — SIGNIFICANT CHANGE UP (ref 0–0.5)
EOSINOPHIL NFR BLD AUTO: 1.1 % — SIGNIFICANT CHANGE UP (ref 0–6)
HCT VFR BLD CALC: 37.1 % — SIGNIFICANT CHANGE UP (ref 34.5–45)
HGB BLD-MCNC: 12.6 G/DL — SIGNIFICANT CHANGE UP (ref 11.5–15.5)
IMM GRANULOCYTES NFR BLD AUTO: 0.3 % — SIGNIFICANT CHANGE UP (ref 0–1.5)
LYMPHOCYTES # BLD AUTO: 1.26 K/UL — SIGNIFICANT CHANGE UP (ref 1–3.3)
LYMPHOCYTES # BLD AUTO: 18 % — SIGNIFICANT CHANGE UP (ref 13–44)
MCHC RBC-ENTMCNC: 32.9 PG — SIGNIFICANT CHANGE UP (ref 27–34)
MCHC RBC-ENTMCNC: 34 GM/DL — SIGNIFICANT CHANGE UP (ref 32–36)
MCV RBC AUTO: 96.9 FL — SIGNIFICANT CHANGE UP (ref 80–100)
MONOCYTES # BLD AUTO: 0.3 K/UL — SIGNIFICANT CHANGE UP (ref 0–0.9)
MONOCYTES NFR BLD AUTO: 4.3 % — SIGNIFICANT CHANGE UP (ref 2–14)
NEUTROPHILS # BLD AUTO: 5.32 K/UL — SIGNIFICANT CHANGE UP (ref 1.8–7.4)
NEUTROPHILS NFR BLD AUTO: 75.9 % — SIGNIFICANT CHANGE UP (ref 43–77)
NRBC # BLD: 0 /100 WBCS — SIGNIFICANT CHANGE UP (ref 0–0)
PLATELET # BLD AUTO: 209 K/UL — SIGNIFICANT CHANGE UP (ref 150–400)
RBC # BLD: 3.83 M/UL — SIGNIFICANT CHANGE UP (ref 3.8–5.2)
RBC # FLD: 12 % — SIGNIFICANT CHANGE UP (ref 10.3–14.5)
WBC # BLD: 7.01 K/UL — SIGNIFICANT CHANGE UP (ref 3.8–10.5)
WBC # FLD AUTO: 7.01 K/UL — SIGNIFICANT CHANGE UP (ref 3.8–10.5)

## 2022-05-04 PROCEDURE — 36415 COLL VENOUS BLD VENIPUNCTURE: CPT

## 2022-05-04 PROCEDURE — 99213 OFFICE O/P EST LOW 20 MIN: CPT

## 2022-05-04 PROCEDURE — 85027 COMPLETE CBC AUTOMATED: CPT

## 2022-05-05 LAB
ALBUMIN SERPL ELPH-MCNC: 4.8 G/DL
ALP BLD-CCNC: 72 U/L
ALT SERPL-CCNC: 12 U/L
ANION GAP SERPL CALC-SCNC: 14 MMOL/L
AST SERPL-CCNC: 27 U/L
BILIRUB SERPL-MCNC: 0.5 MG/DL
BUN SERPL-MCNC: 15 MG/DL
CALCIUM SERPL-MCNC: 10.3 MG/DL
CHLORIDE SERPL-SCNC: 100 MMOL/L
CO2 SERPL-SCNC: 24 MMOL/L
CREAT SERPL-MCNC: 0.73 MG/DL
EGFR: 96 ML/MIN/1.73M2
GLUCOSE SERPL-MCNC: 140 MG/DL
POTASSIUM SERPL-SCNC: 4.3 MMOL/L
PROT SERPL-MCNC: 7.1 G/DL
SODIUM SERPL-SCNC: 138 MMOL/L

## 2022-06-06 ENCOUNTER — APPOINTMENT (OUTPATIENT)
Dept: OBGYN | Facility: CLINIC | Age: 57
End: 2022-06-06
Payer: COMMERCIAL

## 2022-06-06 VITALS
BODY MASS INDEX: 21.62 KG/M2 | DIASTOLIC BLOOD PRESSURE: 70 MMHG | HEIGHT: 63 IN | WEIGHT: 122 LBS | SYSTOLIC BLOOD PRESSURE: 124 MMHG

## 2022-06-06 DIAGNOSIS — Z01.419 ENCOUNTER FOR GYNECOLOGICAL EXAMINATION (GENERAL) (ROUTINE) W/OUT ABNORMAL FINDINGS: ICD-10-CM

## 2022-06-06 DIAGNOSIS — Z12.11 ENCOUNTER FOR SCREENING FOR MALIGNANT NEOPLASM OF COLON: ICD-10-CM

## 2022-06-06 DIAGNOSIS — Z12.12 ENCOUNTER FOR SCREENING FOR MALIGNANT NEOPLASM OF COLON: ICD-10-CM

## 2022-06-06 PROCEDURE — 99386 PREV VISIT NEW AGE 40-64: CPT

## 2022-06-06 NOTE — PHYSICAL EXAM
[Appropriately responsive] : appropriately responsive [Alert] : alert [No Acute Distress] : no acute distress [No Lymphadenopathy] : no lymphadenopathy [Regular Rate Rhythm] : regular rate rhythm [No Murmurs] : no murmurs [Clear to Auscultation B/L] : clear to auscultation bilaterally [Soft] : soft [Non-tender] : non-tender [Non-distended] : non-distended [No HSM] : No HSM [No Lesions] : no lesions [No Mass] : no mass [Oriented x3] : oriented x3 [Labia Majora] : normal [Labia Minora] : normal [Normal] : normal [Uterine Adnexae] : normal [Declined] : Patient declined rectal exam

## 2022-06-06 NOTE — HISTORY OF PRESENT ILLNESS
[Patient reported mammogram was abnormal] : Patient reported mammogram was abnormal [Patient reported breast sonogram was abnormal] : Patient reported breast sonogram was abnormal [Patient reported PAP Smear was normal] : Patient reported PAP Smear was normal [Patient reported bone density results were normal] : Patient reported bone density results were normal [HIV test declined] : HIV test declined [Syphilis test declined] : Syphilis test declined [Gonorrhea test declined] : Gonorrhea test declined [Chlamydia test declined] : Chlamydia test declined [Trichomonas test declined] : Trichomonas test declined [HPV test offered] : HPV test offered [postmenopausal] : postmenopausal [Y] : Positive pregnancy history [TextBox_4] : 57 yo  for annual exam today.  Hx breast cancer and bilateral mastectomy in  with radiation but without chemotherapy.  Reconstructive surgery performed. Followed by Dr. Staley   x 2, knee replacement surgery and hypertension.  No abnormal Pap smears.  Last pap smear 4 years ago - normal and normal bone density .  Has never had a colonoscopy.   [Mammogramdate] : 2021 [TextBox_19] : Breast CA [BreastSonogramDate] : 2021 [TextBox_25] : Breast CA [PapSmeardate] : 2018 [BoneDensityDate] : 2022 [ColonoscopyDate] : never [LMPDate] :  [PGHxTotal] : 5 [[facility] : 2 [[facility] : 2

## 2022-06-06 NOTE — DISCUSSION/SUMMARY
[FreeTextEntry1] : Unremarkable pelvic exam\par Pap and HPV collected\par Breast exam deferred as double mastectomy and breast care managed by Dr. Staley\par  I reviewed measures for maintaining optimal bone density including dietary intake of 1200 mg calcium and 800 units  of vitamin D daily and 30 minutes of weight bearing exercise for a minimum of 3 x weekly.  Patient given a list of dietary sources of calcium and vitamin D.  Patient verbalizes understanding of these recommendations health \par Colonoscopy referral given\par Up to Date on Dermatology and Dental care\par Healthy diet, exercise, sleep hygiene discussed\par RTO x 1 year or prn\par

## 2022-06-07 LAB — HPV HIGH+LOW RISK DNA PNL CVX: NOT DETECTED

## 2022-06-09 LAB — CYTOLOGY CVX/VAG DOC THIN PREP: ABNORMAL

## 2022-07-13 ENCOUNTER — APPOINTMENT (OUTPATIENT)
Dept: CARDIOLOGY | Facility: CLINIC | Age: 57
End: 2022-07-13

## 2022-07-13 ENCOUNTER — NON-APPOINTMENT (OUTPATIENT)
Age: 57
End: 2022-07-13

## 2022-07-13 VITALS
WEIGHT: 120 LBS | HEART RATE: 73 BPM | TEMPERATURE: 97.8 F | BODY MASS INDEX: 21.26 KG/M2 | SYSTOLIC BLOOD PRESSURE: 132 MMHG | DIASTOLIC BLOOD PRESSURE: 80 MMHG | OXYGEN SATURATION: 98 %

## 2022-07-13 PROCEDURE — 93000 ELECTROCARDIOGRAM COMPLETE: CPT

## 2022-07-13 PROCEDURE — 99204 OFFICE O/P NEW MOD 45 MIN: CPT | Mod: 25

## 2022-07-13 NOTE — REASON FOR VISIT
[FreeTextEntry1] : The patient is seen because of an abnormal ECG.  The patient was on a long plane flight almost 2 months ago.  She woke up in the morning and felt extremely dizzy.  She thought she might have vertigo.  Her ECG had nonspecific changes.  She subsequently was referred to  who reviewed her ECG.  Dr. Petar Livingston recommended CT pulmonary angiography to rule out pulmonary embolism.  The patient denies any shortness of breath.  Almost 2 months have passed.  The patient's vertigo has resolved.  The patient walks well without exertional symptoms.

## 2022-07-13 NOTE — ASSESSMENT
[FreeTextEntry1] : Abnormal ECG: The risk and benefit of stress testing have been reviewed.  The patient does not wish to undergo any further testing.  I think it is safe to withhold CT pulmonary angiography.\par \par Hypertension: Well-controlled.

## 2022-08-15 ENCOUNTER — OUTPATIENT (OUTPATIENT)
Dept: OUTPATIENT SERVICES | Facility: HOSPITAL | Age: 57
LOS: 1 days | End: 2022-08-15
Payer: COMMERCIAL

## 2022-08-15 DIAGNOSIS — C50.919 MALIGNANT NEOPLASM OF UNSPECIFIED SITE OF UNSPECIFIED FEMALE BREAST: ICD-10-CM

## 2022-08-16 ENCOUNTER — RESULT REVIEW (OUTPATIENT)
Age: 57
End: 2022-08-16

## 2022-08-16 ENCOUNTER — APPOINTMENT (OUTPATIENT)
Dept: HEMATOLOGY ONCOLOGY | Facility: CLINIC | Age: 57
End: 2022-08-16

## 2022-08-16 VITALS
WEIGHT: 123 LBS | BODY MASS INDEX: 21.79 KG/M2 | HEART RATE: 57 BPM | SYSTOLIC BLOOD PRESSURE: 144 MMHG | DIASTOLIC BLOOD PRESSURE: 84 MMHG | OXYGEN SATURATION: 100 % | TEMPERATURE: 97.9 F

## 2022-08-16 LAB
25(OH)D3 SERPL-MCNC: 49.3 NG/ML
ALBUMIN SERPL ELPH-MCNC: 5 G/DL
ALP BLD-CCNC: 76 U/L
ALT SERPL-CCNC: 10 U/L
ANION GAP SERPL CALC-SCNC: 12 MMOL/L
AST SERPL-CCNC: 28 U/L
BASOPHILS # BLD AUTO: 0.02 K/UL — SIGNIFICANT CHANGE UP (ref 0–0.2)
BASOPHILS NFR BLD AUTO: 0.4 % — SIGNIFICANT CHANGE UP (ref 0–2)
BILIRUB SERPL-MCNC: 0.5 MG/DL
BUN SERPL-MCNC: 12 MG/DL
CALCIUM SERPL-MCNC: 10.2 MG/DL
CHLORIDE SERPL-SCNC: 101 MMOL/L
CO2 SERPL-SCNC: 24 MMOL/L
CREAT SERPL-MCNC: 0.71 MG/DL
EGFR: 99 ML/MIN/1.73M2
EOSINOPHIL # BLD AUTO: 0.05 K/UL — SIGNIFICANT CHANGE UP (ref 0–0.5)
EOSINOPHIL NFR BLD AUTO: 0.9 % — SIGNIFICANT CHANGE UP (ref 0–6)
GLUCOSE SERPL-MCNC: 106 MG/DL
HCT VFR BLD CALC: 36.4 % — SIGNIFICANT CHANGE UP (ref 34.5–45)
HGB BLD-MCNC: 12.7 G/DL — SIGNIFICANT CHANGE UP (ref 11.5–15.5)
IMM GRANULOCYTES NFR BLD AUTO: 0.2 % — SIGNIFICANT CHANGE UP (ref 0–1.5)
LYMPHOCYTES # BLD AUTO: 1.11 K/UL — SIGNIFICANT CHANGE UP (ref 1–3.3)
LYMPHOCYTES # BLD AUTO: 19.6 % — SIGNIFICANT CHANGE UP (ref 13–44)
MCHC RBC-ENTMCNC: 34.1 PG — HIGH (ref 27–34)
MCHC RBC-ENTMCNC: 34.9 GM/DL — SIGNIFICANT CHANGE UP (ref 32–36)
MCV RBC AUTO: 97.8 FL — SIGNIFICANT CHANGE UP (ref 80–100)
MONOCYTES # BLD AUTO: 0.32 K/UL — SIGNIFICANT CHANGE UP (ref 0–0.9)
MONOCYTES NFR BLD AUTO: 5.6 % — SIGNIFICANT CHANGE UP (ref 2–14)
NEUTROPHILS # BLD AUTO: 4.16 K/UL — SIGNIFICANT CHANGE UP (ref 1.8–7.4)
NEUTROPHILS NFR BLD AUTO: 73.3 % — SIGNIFICANT CHANGE UP (ref 43–77)
NRBC # BLD: 0 /100 WBCS — SIGNIFICANT CHANGE UP (ref 0–0)
PLATELET # BLD AUTO: 210 K/UL — SIGNIFICANT CHANGE UP (ref 150–400)
POTASSIUM SERPL-SCNC: 4.6 MMOL/L
PROT SERPL-MCNC: 7.1 G/DL
RBC # BLD: 3.72 M/UL — LOW (ref 3.8–5.2)
RBC # FLD: 12 % — SIGNIFICANT CHANGE UP (ref 10.3–14.5)
SODIUM SERPL-SCNC: 138 MMOL/L
WBC # BLD: 5.67 K/UL — SIGNIFICANT CHANGE UP (ref 3.8–10.5)
WBC # FLD AUTO: 5.67 K/UL — SIGNIFICANT CHANGE UP (ref 3.8–10.5)

## 2022-08-16 PROCEDURE — 85027 COMPLETE CBC AUTOMATED: CPT

## 2022-08-16 PROCEDURE — 99213 OFFICE O/P EST LOW 20 MIN: CPT

## 2022-08-26 NOTE — PHYSICAL EXAM
[Fully active, able to carry on all pre-disease performance without restriction] : Status 0 - Fully active, able to carry on all pre-disease performance without restriction [Normal] : affect appropriate [de-identified] : anicteric [de-identified] : b/l mastectomies with TE in place. mild asymmetry, R larger than L. No nodularity, open wounds or drainage, no erythema

## 2022-08-26 NOTE — HISTORY OF PRESENT ILLNESS
[de-identified] : Ms. 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  Core biopsy was ER/MT+, Her2 negative by [facility]. \par \par Emily underwent bilateral mastectomy (right was prophylactic) with sentinel node biopsy and reconstruction with tissue expanders on May, 21, 2021. Final surgical pathology revealed left invasive ductal carcinoma (Grade 2), ER/MT+, Her2-, measuring 2.5 x 1.8 x 1.5cm. The anterior margin was positive. [place] lymph node was negative. There was no evidence of DCIS or LCIS. [id number].\par \par Oncotype testing revealed a recurrence score of 11.  She is post-menopausal. Myriad testing was negative.\par \par Emily completed adjuvant radiation to the left breast with Dr. Kelley in mid-September. She developed some skin erythema and breakdown, but overall did very well. [de-identified] : She denies any ongoing fevers or chills, no headache, no lumps or bumps, no weight loss, no bleeding or bruising. Doing well on AI.\par

## 2022-08-26 NOTE — RESULTS/DATA
[FreeTextEntry1] : Ms. Che is a pleasant 56 year-old woman with [id number] ER/NC+, Her2- left breast cancer status-post bilateral mastectomy and sentinel lymph node biopsy, now s/p RT, here in follow-up on anastrozole.

## 2022-08-26 NOTE — RESULTS/DATA
[FreeTextEntry1] : Ms. Che is a pleasant 56 year-old woman with [id number] ER/AL+, Her2- left breast cancer status-post bilateral mastectomy and sentinel lymph node biopsy, now s/p RT, here in follow-up on anastrozole.

## 2022-08-26 NOTE — PHYSICAL EXAM
[Fully active, able to carry on all pre-disease performance without restriction] : Status 0 - Fully active, able to carry on all pre-disease performance without restriction [Normal] : affect appropriate [de-identified] : anicteric [de-identified] : b/l mastectomies with implants. No nodularity, open wounds or drainage, no erythema

## 2022-08-26 NOTE — HISTORY OF PRESENT ILLNESS
[de-identified] : Ms. Che was diagnosed with left breast ER/VT+, Her2 negative invasive ductal carcinoma on ultrasound-guided biopsy, which was completed on April 22, 2021.  Core biopsy was ER/VT+, Her2 negative by [facility]. \par \par Emily underwent bilateral mastectomy (right was prophylactic) with sentinel node biopsy and reconstruction with tissue expanders on May, 21, 2021. Final surgical pathology revealed left invasive ductal carcinoma (Grade 2), ER/VT+, Her2-, measuring 2.5 x 1.8 x 1.5cm. The anterior margin was positive. [place] lymph node was negative. There was no evidence of DCIS or LCIS. [id number].\par \par Oncotype testing revealed a recurrence score of 11.  She is post-menopausal. Myriad testing was negative.\par \par Emily completed adjuvant radiation to the left breast with Dr. Kelley in mid-September. She developed some skin erythema and breakdown, but overall did very well. [de-identified] : Pending bilateral implant placement 2/17/22. \par \par No issues on anastrozole. Some hot flashes, but not dose-limiting. No arthralgias. Weight is stable. She denies any ongoing fevers or chills, no headache, no lumps or bumps, no bleeding or bruising.\par

## 2022-10-10 ENCOUNTER — APPOINTMENT (OUTPATIENT)
Dept: BREAST CENTER | Facility: CLINIC | Age: 57
End: 2022-10-10

## 2022-10-11 ENCOUNTER — APPOINTMENT (OUTPATIENT)
Dept: PLASTIC SURGERY | Facility: CLINIC | Age: 57
End: 2022-10-11

## 2022-10-11 VITALS
HEART RATE: 58 BPM | RESPIRATION RATE: 16 BRPM | OXYGEN SATURATION: 98 % | DIASTOLIC BLOOD PRESSURE: 81 MMHG | TEMPERATURE: 98.6 F | SYSTOLIC BLOOD PRESSURE: 131 MMHG | HEIGHT: 63 IN | WEIGHT: 121 LBS | BODY MASS INDEX: 21.44 KG/M2

## 2022-10-11 DIAGNOSIS — N65.1 DISPROPORTION OF RECONSTRUCTED BREAST: ICD-10-CM

## 2022-10-11 PROCEDURE — 99215 OFFICE O/P EST HI 40 MIN: CPT

## 2022-10-11 NOTE — REVIEW OF SYSTEMS
[Negative] : Musculoskeletal [FreeTextEntry2] : Has been active and keeping her weight stable. [FreeTextEntry5] : Had a recent work up for dizziness vs vertigo that was thought to be related to an arrythmia. She was sent to the ER, but didn't stay. She saw a second cardiologist who told her that everything was ok.

## 2022-10-11 NOTE — PHYSICAL EXAM
[NI] : Normal [de-identified] : NL respiratory effort noted  [de-identified] : Bilateral implants in place with soft capsules.\par The soft tissue coverage is better on the right than the left.\par Animation present bilaterally but mild on the right and some window shading on the left.\par The soft tissues and skin are much more pliable now, especially centrally.\par The soft tissue coverage is thin and not very pliable in the left lower pole and the folds of the implant are visible.\par Lateral chest wall soft tissue deficit with palpable tiny 3mm lump, likely a surgical clip. [de-identified] : No breasts, s/p bilateral mastectomy.\par  [de-identified] : Mild adipose, moderate skin laxity.\par Some adipose in flanks. [de-identified] : Mild excess adipose in medial thighs and lateral thighs.\par bilateral upper arm skin laxity and minimal fat excess.

## 2022-10-11 NOTE — REASON FOR VISIT
[Follow-Up: _____] : a [unfilled] follow-up visit [FreeTextEntry1] : Patient states everything is going good.

## 2022-10-11 NOTE — ASSESSMENT
[FreeTextEntry1] : Doing well overall, but there is animation deformity on the left with window shading and soft tissue deficit centrally and inferiorly. \par We discussed fat grafting to this area vs site change to prepectoral. She does not want a significant change or to take increased risks, with the site change, but she would like to proceed with fat grafting to the central area of the left neobreast where the skin is more pliable.\par We reviewed the risks, benefits and alternatives to fat grafting, including, but not limited to, infection of the implant requiring removal, fat necrosis resembling a breast mass and oil cysts. We also reviewed the soreness and bruising expected at the fat harvest (liposuction) sites. We reviewed the possibility of contour deformity at the harvest sites. I also discussed the timeline for fat graft take, and that it will take 2-3 months to see how much of the fat will live in its new location. Additional sessions of fat grafting may be required.\par She was given an opportunity to ask questions, and all of her questions were answered. She wishes to proceed. \par \par We also discussed the need for cardiac clearance after this episode of dizziness and possible arrhythmia.

## 2022-10-11 NOTE — HISTORY OF PRESENT ILLNESS
[FreeTextEntry1] : Pt complains of wrinkling of the skin in the central area on the left.\par She also feels "weird" bumps under the breast on the left and a smaller bump on the left chest wall.

## 2022-10-23 ENCOUNTER — RESULT CHARGE (OUTPATIENT)
Age: 57
End: 2022-10-23

## 2022-10-24 ENCOUNTER — APPOINTMENT (OUTPATIENT)
Dept: CARDIOLOGY | Facility: CLINIC | Age: 57
End: 2022-10-24

## 2022-10-24 ENCOUNTER — NON-APPOINTMENT (OUTPATIENT)
Age: 57
End: 2022-10-24

## 2022-10-24 VITALS
TEMPERATURE: 97.1 F | WEIGHT: 122 LBS | HEART RATE: 64 BPM | SYSTOLIC BLOOD PRESSURE: 146 MMHG | OXYGEN SATURATION: 98 % | BODY MASS INDEX: 21.62 KG/M2 | HEIGHT: 63 IN | DIASTOLIC BLOOD PRESSURE: 80 MMHG

## 2022-10-24 DIAGNOSIS — Z01.818 ENCOUNTER FOR OTHER PREPROCEDURAL EXAMINATION: ICD-10-CM

## 2022-10-24 PROCEDURE — 93000 ELECTROCARDIOGRAM COMPLETE: CPT

## 2022-10-24 PROCEDURE — 99214 OFFICE O/P EST MOD 30 MIN: CPT | Mod: 25

## 2022-10-24 RX ORDER — AMLODIPINE BESYLATE 10 MG/1
10 TABLET ORAL DAILY
Refills: 0 | Status: ACTIVE | COMMUNITY

## 2022-10-25 NOTE — HISTORY OF PRESENT ILLNESS
[FreeTextEntry1] : JEEVAN LUGO is a 57 year old female with a past medical history of abn EKG, HTN, breast cancer s/p BL mastectomy and radiation.\par \par Lat seen 7/13/22 with complaints of feeling extremely dizzy the morning after a flight. She thought she might have vertigo. Her ECG had nonspecific changes. She subsequently was referred to  who reviewed her ECG. Dr. Petar Livingston recommended CT pulmonary angiography to rule out pulmonary embolism which she never did. Stress testing recommended and patient declined.\par \par Presents today, 7/13/22, for cardiac clearance prior to breast reconstruction/fat grafting at [facility] with Dr. Villegas on 10/28/22. In the interim there have been no hospitalizations or procedures. She denies chest pain, pressure, palpitations, unusual shortness of breath, orthopnea, LE edema, lightheadedness, dizziness, near syncope or syncope. Former smoker. Extremely avid exerciser without exertional complaints. Works at gym, teaches many classes, runs 3 miles a day, does hot yoga.\par \par BP on my exam 160/90. Asymptomatic. Endorses dietary indiscretion last night at a celebrate dinner for her birthday.\par \par Testing:\par \par EKG 10/24/22: SR at 64 bpm, W[place] inteval 172 ms, QTc 417 ms, Q waves in V1, PRWP, nonspecific ST-T wave abnormalities \par \par Labs 8/2022: WBC 5.67, Hgb 12.7, HCT 36.4, plt 210, Na 138, K 4.6, Cr 0.71, AST 28, ALT 10, \par \par Echo 5/26/22: Technically difficult study. Off axis imaging secondary to surgical reconstruction. EF 62%. Mild mitral annular calcification. Aortic cusp appear mildly calcfiied. No sig change from prior study.

## 2022-10-25 NOTE — ADDENDUM
[FreeTextEntry1] : Please note the patient was reviewed with the NP.\par I was physically present during the service of the patient\par I was directly involved in the management plan and recommendations of care provided to the patient. \par I personally reviewed the history and physical exam and plan as documented by the NP above.\par \par Mayco Cuba DO, FACC, RPVI\par Cardiologist\par

## 2022-10-25 NOTE — ASSESSMENT
[FreeTextEntry1] : JEEVAN LUGO is a 57 year old F who presents today Oct 24, 2022 with the above history and the following active issues:\par \par Abn EKG: Asymptomatic. Echo 5/2022 with normal EF.\par \par Preoperative cardiovascular examination.\par Patient may proceed with breast reconstruction/fat grafting at [facility] [place] with Dr. Villegas on 10/28/22.\par At present, there are no active cardiac conditions. \par No recent unstable coronary syndromes, decompensated heart failure, severe valvular heart disease or significant dysrhythmias.  \par Baseline functional status is excellent.   \par The clinical benefit of the proposed procedure outweighs the associated cardiovascular risk.  \par Risk not attenuated with further CV testing.  \par Prior testing as outlined above.\par Optimized from a cardiovascular perspective.\par DVT ppx\par \par HTN: BP not well controlled in the setting of dietary indiscretion. Continue Amlodipine. Educated patient on low salt diet, alcohol intake in moderation, regular cardiovascular exercise, and weight reduction for improved BP control. Continue to monitor BP at home and call for persistently elevated readings (>140/90). \par \par Ongoing f/u with PCP.\par \par Consider ischemic eval in future.\par \par F/U in 1 month for BP check.\par Discussed red flag symptoms, which would warrant sooner or emergent medical evaluation.\par Any questions and concerns were addressed and resolved.\par \par Sincerely,\par Stacie Amaro [facility]\par Patient's history, testing, and plan was reviewed with supervising physician, Dr. Mayco Cuba

## 2022-10-28 ENCOUNTER — APPOINTMENT (OUTPATIENT)
Dept: PLASTIC SURGERY | Facility: HOSPITAL | Age: 57
End: 2022-10-28

## 2022-10-29 ENCOUNTER — NON-APPOINTMENT (OUTPATIENT)
Age: 57
End: 2022-10-29

## 2022-11-02 ENCOUNTER — APPOINTMENT (OUTPATIENT)
Dept: PLASTIC SURGERY | Facility: CLINIC | Age: 57
End: 2022-11-02

## 2022-11-02 VITALS
OXYGEN SATURATION: 98 % | WEIGHT: 122 LBS | BODY MASS INDEX: 21.62 KG/M2 | HEIGHT: 63 IN | HEART RATE: 61 BPM | RESPIRATION RATE: 16 BRPM | TEMPERATURE: 97.9 F | DIASTOLIC BLOOD PRESSURE: 80 MMHG | SYSTOLIC BLOOD PRESSURE: 128 MMHG

## 2022-11-02 PROCEDURE — 99024 POSTOP FOLLOW-UP VISIT: CPT

## 2022-11-02 NOTE — PHYSICAL EXAM
[NI] : Normal [de-identified] : NL respiratory effort noted  [de-identified] : Bilateral implants in place.\par Mild ecchymosis on the left.\par There is some irregularity of the contour.\par Right no change. [de-identified] : No breasts, s/p bilateral mastectomy.  [de-identified] : Bilateral flanks with mild ecchymosis. [de-identified] : Bilateral medial and lateral thigh ecchymosis, moderate. [de-identified] : Absorbable sutures in place.

## 2022-11-02 NOTE — HISTORY OF PRESENT ILLNESS
[FreeTextEntry1] : Pt is worried that she is having wrinkles in the skin laterally and that the result is lumpy.\par She reports that the donor site bruising was not too painful.\par She would like to go back to exercising and running.

## 2022-11-02 NOTE — ASSESSMENT
[FreeTextEntry1] : Stable postop.\par Continue abx until finished.\par Pt reports she is also interested in a necklift. This would need to be a separate cosmetic, self-pay appointment.\par Follow up in one month and then in 2 months to better assess fat grafting result. It is too early to see the eventual contour.\par OK to gradually resume activity. Ok to start running again. Avoid chest exercise such as pushups and pec fly for at least two weeks after surgery and start slowly. Do not resume the preop routine. Yoga limited to lower body only.

## 2022-11-02 NOTE — REASON FOR VISIT
[Follow-Up: _____] : a [unfilled] follow-up visit [FreeTextEntry1] : Patient states she has no issues to report

## 2022-11-10 ENCOUNTER — OUTPATIENT (OUTPATIENT)
Dept: OUTPATIENT SERVICES | Facility: HOSPITAL | Age: 57
LOS: 1 days | End: 2022-11-10
Payer: COMMERCIAL

## 2022-11-10 DIAGNOSIS — C50.919 MALIGNANT NEOPLASM OF UNSPECIFIED SITE OF UNSPECIFIED FEMALE BREAST: ICD-10-CM

## 2022-11-14 ENCOUNTER — APPOINTMENT (OUTPATIENT)
Dept: BREAST CENTER | Facility: CLINIC | Age: 57
End: 2022-11-14

## 2022-11-14 VITALS
BODY MASS INDEX: 21.62 KG/M2 | SYSTOLIC BLOOD PRESSURE: 158 MMHG | HEIGHT: 63 IN | DIASTOLIC BLOOD PRESSURE: 79 MMHG | HEART RATE: 59 BPM | WEIGHT: 122 LBS | TEMPERATURE: 97.3 F

## 2022-11-14 PROCEDURE — 93702 BIS XTRACELL FLUID ANALYSIS: CPT | Mod: NC

## 2022-11-14 PROCEDURE — 99214 OFFICE O/P EST MOD 30 MIN: CPT | Mod: 25

## 2022-11-14 NOTE — PROCEDURE
[FreeTextEntry1] : [facility] [FreeTextEntry2] : Lymphedema monitoring [FreeTextEntry3] : The patient had a 6 mos f.u (pt is 1.5 years out)  SOZO measurement which I reviewed today. The score is WNL, see scanned to EMR. Bioimpedance spectroscopy helps identify the onset of lymphedema in an arm or leg before patients experience noticeable swelling. Research has shown that the early detection of lymphedema using L-Dex combined with treatment can reduce progression to chronic lymphedema by 95% in breast cancer patients. Whenever possible, patients are tested for baseline L-Dex score before cancer treatment begins and then are reassessed during regular follow-up visits using the SOZO device. Otherwise, this can be started postoperatively and continued during regular follow-up visits. If the patient’s L-Dex score increases above normal levels, that is a sign that lymphedema is developing and a referral is made to physical therapy for further evaluation and early compression treatment. Lymphedema assessment with the SOZO L-Dex score is recommended to be done every 3 months for the first 3 years and then every 6 months for years 4 and 5 followed by annually afterwards.\par \par

## 2022-11-14 NOTE — HISTORY OF PRESENT ILLNESS
[FreeTextEntry1] : Pt is a 57 year old MYRIAD negative white female here for follow up after 5/21/21 R prophylactic and L mastectomy w/ SLNbx and bilat recon w/ TE per Dr. Herrera for Surgical PATH: R negative, L IDC mod diff, anterior margin + invasive ca, posterior margin 1.0mm from invasive ca, pT2N0/1Mx, measures 2.5x1.8x1.5cm, Grade 2, -DCIS, -LCIS, - LVI, focal ALH, FCC w/ apocrine metaplasia, ER+ >95%, NJ+ >95%, Her2 CISH-. Now had implants and recently had fat grafting. \par \par Implant was removed dus to rupture prior to EBRT for positive margins per Dr. Kelley.\par Oncotype 11, low. Pt on anastrazole per Dr. GONZALEZ. \par \par PCP is Bethany Wright MD.\par \par Hx of bilat breast implants placed 2004. \par Sozo was done last visit, 1 mos ago. \par Fhx: Breast CA - mother 85, maunt-unknown ag at diagnosis. Not AJ. \par

## 2022-11-14 NOTE — PHYSICAL EXAM
[Normocephalic] : normocephalic [Atraumatic] : atraumatic [Supple] : supple [No Supraclavicular Adenopathy] : no supraclavicular adenopathy [No Thyromegaly] : no thyromegaly [Examined in the supine and seated position] : examined in the supine and seated position [Asymmetrical] : asymmetrical [No dominant masses] : no dominant masses in right breast  [No dominant masses] : no dominant masses left breast [No Nipple Retraction] : no left nipple retraction [No Nipple Discharge] : no left nipple discharge [No Axillary Lymphadenopathy] : no left axillary lymphadenopathy [No Edema] : no edema [No Swelling] : no swelling [Full ROM] : full range of motion [No Rashes] : no rashes [No Ulceration] : no ulceration

## 2022-11-14 NOTE — CONSULT LETTER
[Dear  ___] : Dear  [unfilled], [Consult Letter:] : I had the pleasure of evaluating your patient, [unfilled]. [Please see my note below.] : Please see my note below. [Consult Closing:] : Thank you very much for allowing me to participate in the care of this patient.  If you have any questions, please do not hesitate to contact me. [Sincerely,] : Sincerely, [FreeTextEntry3] : Tegan Staley MD

## 2022-11-14 NOTE — ASSESSMENT
[FreeTextEntry1] : Pt is a 57 year old MYRIAD negative white female here for follow up after 5/21/21 R prophylactic and L mastectomy w/ SLNbx and bilat recon w/ TE per Dr. Herrera for Surgical PATH: R negative, L IDC mod diff, anterior margin + invasive ca, posterior margin 1.0mm from invasive ca, pT2N0/1Mx, measures 2.5x1.8x1.5cm, Grade 2, -DCIS, -LCIS, - LVI, focal ALH, FCC w/ apocrine metaplasia, ER+ >95%, [place]+ >95%, Her2 CISH-. Now had implants and recently had fat grafting. \par \par Implant was removed dus to rupture prior to EBRT for positive margins per Dr. Kelley.\par Oncotype 11, low. Pt on anastrazole per Dr. GONZALEZ. \par \par PCP is Bethany Wright MD.\par \par Hx of bilat breast implants placed 2004. \par Sozo was done last visit, 1 mos ago. \par Fhx: Breast CA - mother 85, maunt-unknown ag at diagnosis. Not AJ. \par \par CBE: Bilat mastectomy with implants. No NAC recon (pt not sure she wants it).  No lymphadenopathy. Full ROM and no LE.\par  F/u w/ Dr. Kellogg, on anastrazole. Still occasionally sees DR. Kelley. Recently had fat grafting per Dr. Herrera and pt unsure if she want NAC reconstruction. \par  \par

## 2022-11-15 ENCOUNTER — APPOINTMENT (OUTPATIENT)
Age: 57
End: 2022-11-15

## 2022-11-15 ENCOUNTER — RESULT REVIEW (OUTPATIENT)
Age: 57
End: 2022-11-15

## 2022-11-15 VITALS
HEIGHT: 63 IN | DIASTOLIC BLOOD PRESSURE: 87 MMHG | SYSTOLIC BLOOD PRESSURE: 151 MMHG | OXYGEN SATURATION: 99 % | BODY MASS INDEX: 21.76 KG/M2 | TEMPERATURE: 97.4 F | HEART RATE: 51 BPM | WEIGHT: 122.8 LBS

## 2022-11-15 LAB
BASOPHILS # BLD AUTO: 0.01 K/UL — SIGNIFICANT CHANGE UP (ref 0–0.2)
BASOPHILS NFR BLD AUTO: 0.2 % — SIGNIFICANT CHANGE UP (ref 0–2)
EOSINOPHIL # BLD AUTO: 0.06 K/UL — SIGNIFICANT CHANGE UP (ref 0–0.5)
EOSINOPHIL NFR BLD AUTO: 0.9 % — SIGNIFICANT CHANGE UP (ref 0–6)
HCT VFR BLD CALC: 35.6 % — SIGNIFICANT CHANGE UP (ref 34.5–45)
HGB BLD-MCNC: 11.9 G/DL — SIGNIFICANT CHANGE UP (ref 11.5–15.5)
IMM GRANULOCYTES NFR BLD AUTO: 0.2 % — SIGNIFICANT CHANGE UP (ref 0–0.9)
LYMPHOCYTES # BLD AUTO: 0.98 K/UL — LOW (ref 1–3.3)
LYMPHOCYTES # BLD AUTO: 14.7 % — SIGNIFICANT CHANGE UP (ref 13–44)
MCHC RBC-ENTMCNC: 33.3 PG — SIGNIFICANT CHANGE UP (ref 27–34)
MCHC RBC-ENTMCNC: 33.4 GM/DL — SIGNIFICANT CHANGE UP (ref 32–36)
MCV RBC AUTO: 99.7 FL — SIGNIFICANT CHANGE UP (ref 80–100)
MONOCYTES # BLD AUTO: 0.4 K/UL — SIGNIFICANT CHANGE UP (ref 0–0.9)
MONOCYTES NFR BLD AUTO: 6 % — SIGNIFICANT CHANGE UP (ref 2–14)
NEUTROPHILS # BLD AUTO: 5.19 K/UL — SIGNIFICANT CHANGE UP (ref 1.8–7.4)
NEUTROPHILS NFR BLD AUTO: 78 % — HIGH (ref 43–77)
NRBC # BLD: 0 /100 WBCS — SIGNIFICANT CHANGE UP (ref 0–0)
PLATELET # BLD AUTO: 215 K/UL — SIGNIFICANT CHANGE UP (ref 150–400)
RBC # BLD: 3.57 M/UL — LOW (ref 3.8–5.2)
RBC # FLD: 12.1 % — SIGNIFICANT CHANGE UP (ref 10.3–14.5)
WBC # BLD: 6.65 K/UL — SIGNIFICANT CHANGE UP (ref 3.8–10.5)
WBC # FLD AUTO: 6.65 K/UL — SIGNIFICANT CHANGE UP (ref 3.8–10.5)

## 2022-11-15 PROCEDURE — 85027 COMPLETE CBC AUTOMATED: CPT

## 2022-11-15 PROCEDURE — 99213 OFFICE O/P EST LOW 20 MIN: CPT

## 2022-11-15 RX ORDER — OXYCODONE 5 MG/1
5 TABLET ORAL EVERY 6 HOURS
Qty: 5 | Refills: 0 | Status: DISCONTINUED | COMMUNITY
Start: 2022-10-26 | End: 2022-11-15

## 2022-11-15 RX ORDER — CEPHALEXIN 500 MG/1
500 TABLET ORAL 3 TIMES DAILY
Qty: 21 | Refills: 0 | Status: DISCONTINUED | COMMUNITY
Start: 2022-10-26 | End: 2022-11-15

## 2022-11-15 RX ORDER — MECLIZINE HYDROCHLORIDE 25 MG/1
25 TABLET ORAL
Qty: 30 | Refills: 0 | Status: DISCONTINUED | COMMUNITY
Start: 2022-05-18 | End: 2022-11-15

## 2022-11-15 NOTE — HISTORY OF PRESENT ILLNESS
[de-identified] : Ms. Che was diagnosed with left breast ER/NJ+, Her2 negative invasive ductal carcinoma on ultrasound-guided biopsy, which was completed on April 22, 2021.  Core biopsy was ER/NJ+, Her2 negative by [facility]. \par \par Emily underwent bilateral mastectomy (right was prophylactic) with sentinel node biopsy and reconstruction with tissue expanders on May, 21, 2021. Final surgical pathology revealed left invasive ductal carcinoma (Grade 2), ER/NJ+, Her2-, measuring 2.5 x 1.8 x 1.5cm. The anterior margin was positive. [place] lymph node was negative. There was no evidence of DCIS or LCIS. [id number].\par \par Oncotype testing revealed a recurrence score of 11.  She is post-menopausal. Myriad testing was negative.\par \par Emily completed adjuvant radiation to the left breast with Dr. Kelley in mid-September, 2021. She developed some skin erythema and breakdown, but overall did very well. [de-identified] : Had fat grafting to the left breast with Dr. Herrera. Still some rippling, understands it will take more time to reach its final form. She denies any ongoing fevers or chills, no headache, no lumps or bumps, no weight loss, no bleeding or bruising.\par \par Tolerating AI without issue.

## 2022-11-15 NOTE — RESULTS/DATA
[FreeTextEntry1] : Ms. Che is a pleasant 57 year-old woman with [id number] ER/KY+, Her2- left breast cancer status-post bilateral mastectomy and sentinel lymph node biopsy, now s/p RT, here in follow-up on anastrozole.

## 2022-11-15 NOTE — PHYSICAL EXAM
[Fully active, able to carry on all pre-disease performance without restriction] : Status 0 - Fully active, able to carry on all pre-disease performance without restriction [Normal] : affect appropriate [de-identified] : anicteric [de-identified] : b/l mastectomies with implants. Left breast s/p fat grafting. Some skin contour nodularity due to grafting

## 2022-11-16 LAB
ALBUMIN SERPL ELPH-MCNC: 4.6 G/DL
ALP BLD-CCNC: 68 U/L
ALT SERPL-CCNC: 7 U/L
ANION GAP SERPL CALC-SCNC: 13 MMOL/L
AST SERPL-CCNC: 22 U/L
BILIRUB SERPL-MCNC: 0.3 MG/DL
BUN SERPL-MCNC: 11 MG/DL
CALCIUM SERPL-MCNC: 10.1 MG/DL
CHLORIDE SERPL-SCNC: 100 MMOL/L
CO2 SERPL-SCNC: 25 MMOL/L
CREAT SERPL-MCNC: 0.72 MG/DL
EGFR: 97 ML/MIN/1.73M2
GLUCOSE SERPL-MCNC: 95 MG/DL
POTASSIUM SERPL-SCNC: 4.3 MMOL/L
PROT SERPL-MCNC: 6.9 G/DL
SODIUM SERPL-SCNC: 137 MMOL/L

## 2022-11-23 ENCOUNTER — APPOINTMENT (OUTPATIENT)
Dept: CARDIOLOGY | Facility: CLINIC | Age: 57
End: 2022-11-23
Payer: COMMERCIAL

## 2022-12-01 ENCOUNTER — APPOINTMENT (OUTPATIENT)
Dept: PLASTIC SURGERY | Facility: CLINIC | Age: 57
End: 2022-12-01

## 2022-12-01 VITALS
WEIGHT: 122 LBS | OXYGEN SATURATION: 99 % | DIASTOLIC BLOOD PRESSURE: 80 MMHG | RESPIRATION RATE: 16 BRPM | HEIGHT: 63 IN | TEMPERATURE: 97.5 F | BODY MASS INDEX: 21.62 KG/M2 | SYSTOLIC BLOOD PRESSURE: 130 MMHG | HEART RATE: 59 BPM

## 2022-12-01 PROCEDURE — 99024 POSTOP FOLLOW-UP VISIT: CPT

## 2022-12-01 NOTE — PHYSICAL EXAM
[NI] : Normal [de-identified] : NL respiratory effort noted  [de-identified] : Bilateral implant in place.\par Left side with improved coverage, but still thin and tight in the lower pole.\par Right side without change.\par Both sides still with some animation.

## 2022-12-01 NOTE — ASSESSMENT
[FreeTextEntry1] : Stable postop.\par Left recon breast is improved, but still has some soft tissue deficit in the lower pole due to the prior wound and the radiation. She also has some animation deformity.\par We discussed waiting to see how the tissues settle on the left as it is only one month since the surgery.\par We also discussed site change of the implants to a prepectoral position to decrease the animation deformity, but she is not interested in a larger surgery at this time.\par Follow up in February as this will be three months after her last surgery (10/28/22).\par We also discussed waiting until everything has settled and no further surgery is planned to proceed with NAC reconstruction.

## 2022-12-01 NOTE — HISTORY OF PRESENT ILLNESS
[FreeTextEntry1] : Pt feels the contour is better, but she still sees a "dent" on the left, a little lower than before.

## 2023-01-10 ENCOUNTER — APPOINTMENT (OUTPATIENT)
Dept: CARDIOLOGY | Facility: CLINIC | Age: 58
End: 2023-01-10
Payer: COMMERCIAL

## 2023-01-10 VITALS
DIASTOLIC BLOOD PRESSURE: 68 MMHG | OXYGEN SATURATION: 99 % | WEIGHT: 123 LBS | SYSTOLIC BLOOD PRESSURE: 126 MMHG | BODY MASS INDEX: 21.79 KG/M2 | TEMPERATURE: 96.2 F | HEART RATE: 62 BPM | HEIGHT: 63 IN

## 2023-01-10 DIAGNOSIS — I10 ESSENTIAL (PRIMARY) HYPERTENSION: ICD-10-CM

## 2023-01-10 DIAGNOSIS — R94.31 ABNORMAL ELECTROCARDIOGRAM [ECG] [EKG]: ICD-10-CM

## 2023-01-10 PROCEDURE — 99213 OFFICE O/P EST LOW 20 MIN: CPT

## 2023-01-10 NOTE — HISTORY OF PRESENT ILLNESS
[FreeTextEntry1] : Abnormal ECG: The patient is asymptomatic.  The patient is good exercise capacity without exertional symptoms.  The patient runs over 2 miles without exertional symptoms.\par \par Hypertension: Well-controlled.

## 2023-01-17 ENCOUNTER — APPOINTMENT (OUTPATIENT)
Dept: PLASTIC SURGERY | Facility: CLINIC | Age: 58
End: 2023-01-17
Payer: COMMERCIAL

## 2023-01-17 VITALS
DIASTOLIC BLOOD PRESSURE: 77 MMHG | HEART RATE: 70 BPM | OXYGEN SATURATION: 98 % | SYSTOLIC BLOOD PRESSURE: 123 MMHG | HEIGHT: 63 IN | RESPIRATION RATE: 16 BRPM | TEMPERATURE: 98 F

## 2023-01-17 DIAGNOSIS — Z42.8 ENCOUNTER FOR OTHER PLASTIC AND RECONSTRUCTIVE SURGERY FOLLOWING MEDICAL PROCEDURE OR HEALED INJURY: ICD-10-CM

## 2023-01-17 PROCEDURE — 99213 OFFICE O/P EST LOW 20 MIN: CPT | Mod: 24

## 2023-01-17 NOTE — ASSESSMENT
[FreeTextEntry1] : Right cheek Mohs surgery is planned.\par We discussed the usual sequence of events, which is to have the Mohs surgery on one day, and then reconstruction later that day or the next day. We discussed the general concept of Mohs, and how the defect size and the time needed to completely clear the area of cancer cells is unpredictable.\par We discussed having multiple different options available, from healing by secondary intent (just wound care, without reconstruction) to primary closure, to local flap surgery to skin grafting.\par \par We discussed the risks of poor wound healing and scarring on the face as well.\par \par She was given an opportunity to ask questions and all of her questions were answered. She wishes to proceed.\par Mohs is scheduled for next week, with reconstruction thereafter.

## 2023-01-17 NOTE — REASON FOR VISIT
[Follow-Up: _____] : a [unfilled] follow-up visit [FreeTextEntry1] : Patient states a month ago she saw a bump on her right cheek and went to her dermatologist to get it biopsied and found out it was BCC.

## 2023-01-17 NOTE — PHYSICAL EXAM
[NI] : Normal [de-identified] : Right cheek skin lesion about 2 mm in size and brown.\par It is just superior and lateral to the right nasolabial fold.\par No ulceration. Not raised. [de-identified] : NL respiratory effort noted  [de-identified] : Not examined. [de-identified] : No breasts, s/p bilateral mastectomy.

## 2023-01-17 NOTE — HISTORY OF PRESENT ILLNESS
[FreeTextEntry1] : Pt well known to me for her bilateral breast reconstruction, but is here now for a new diagnosis of right cheek basal cell carcinoma.\par Pt reports significant sun exposure as a teen, but also has been tanning regularly as an adult.\par Mohs surgery is planned.\par \par Pt reports her recon breasts are OK. Still some wrinkling on the left.

## 2023-01-26 ENCOUNTER — APPOINTMENT (OUTPATIENT)
Dept: PLASTIC SURGERY | Facility: CLINIC | Age: 58
End: 2023-01-26
Payer: COMMERCIAL

## 2023-01-26 VITALS
TEMPERATURE: 98.3 F | HEIGHT: 63 IN | DIASTOLIC BLOOD PRESSURE: 100 MMHG | RESPIRATION RATE: 16 BRPM | SYSTOLIC BLOOD PRESSURE: 120 MMHG | OXYGEN SATURATION: 97 % | HEART RATE: 59 BPM

## 2023-01-26 DIAGNOSIS — Z98.890 OTHER ACQUIRED DEFORMITY OF HEAD: ICD-10-CM

## 2023-01-26 DIAGNOSIS — C44.319 BASAL CELL CARCINOMA OF SKIN OF OTHER PARTS OF FACE: ICD-10-CM

## 2023-01-26 DIAGNOSIS — M95.2 OTHER ACQUIRED DEFORMITY OF HEAD: ICD-10-CM

## 2023-01-26 PROCEDURE — 13131 CMPLX RPR F/C/C/M/N/AX/G/H/F: CPT | Mod: 79

## 2023-01-26 NOTE — PROCEDURE
[Nl] : None [FreeTextEntry1] : Right cheek basal cell carcinoma, now s/p Mohs surgery this morning. Right cheek open wound due to Mohs defect [FreeTextEntry2] : Complex closure of right cheek wound, 2 cm. [FreeTextEntry6] : Following informed consent and marking, the right cheek site was prepped with an alcohol swab and infiltrated with 1% lidocaine with epi. Time was allowed to pass for vasoconstriction.\par The area was prepped with betadine.\par A 2 cm incision was made with a 15 blade scalpel to include the Mohs defect and superior and inferior Burow's triangles. This was carried through the dermis into the subcutaneous tissue. Blunt and sharp scissor dissection was used to excise the wound.\par The wound was carefully examined for hemostasis.\par The closure was then begun. The deep dermis was closed using interrupted 5-0 vicryl sutures. The skin was then closed with interrupted simple 6-0 nylon sutures.\par The incision was then dressed with Bacitracin ointment. May shower tomorrow. Apply ointment TID.\par Follow up in one week.  [FreeTextEntry7] : None

## 2023-01-26 NOTE — REASON FOR VISIT
[Procedure: _________] : a [unfilled] procedure visit [FreeTextEntry1] : Patient states she is still pretty numb, she is also nervous.

## 2023-02-01 ENCOUNTER — APPOINTMENT (OUTPATIENT)
Dept: PLASTIC SURGERY | Facility: CLINIC | Age: 58
End: 2023-02-01
Payer: COMMERCIAL

## 2023-02-01 VITALS
HEIGHT: 63 IN | SYSTOLIC BLOOD PRESSURE: 120 MMHG | BODY MASS INDEX: 21.79 KG/M2 | OXYGEN SATURATION: 99 % | RESPIRATION RATE: 16 BRPM | DIASTOLIC BLOOD PRESSURE: 82 MMHG | WEIGHT: 123 LBS | HEART RATE: 72 BPM | TEMPERATURE: 97.6 F

## 2023-02-01 DIAGNOSIS — Z09 ENCOUNTER FOR FOLLOW-UP EXAMINATION AFTER COMPLETED TREATMENT FOR CONDITIONS OTHER THAN MALIGNANT NEOPLASM: ICD-10-CM

## 2023-02-01 PROCEDURE — 99024 POSTOP FOLLOW-UP VISIT: CPT

## 2023-02-01 NOTE — REASON FOR VISIT
[Follow-Up: _____] : a [unfilled] follow-up visit [FreeTextEntry1] : Patient states she has no concerns or complaints.

## 2023-02-01 NOTE — ASSESSMENT
[FreeTextEntry1] : Doing well s/p right cheek wound closure.\par I reviewed scar care as follows:\par \par Scars tend to heal fairly quickly (in 4-14 days depending on the site), then mature slowly over time. The vast majority of improvement occurs in the first 2-3 months, with the pink/red scar becoming softer, more pliable and lighter in color (depending upon skin tone). At 6 months, the scar appears lighter and at approximately one year, the scar reaches maximum improvement (with a range from 9-18 months).\par Scar care should include skin moisturizer, sunscreen, and if desired, silicone gel. \par Silicone gel use should begin after all scabs and crust are gone and the scars are smooth and dry, usually about 3 weeks. The silicone gel should be used twice a day for a treatment course of three months to achieve benefit. Sunscreen and/or makeup can be applied after the silicone gel has been applied and allowed to dry.\par \par Pt to return next week for breast reconstruction follow up and to make sure the cheek is ready for silicone gel and makeup.

## 2023-02-01 NOTE — PHYSICAL EXAM
[de-identified] : Right medial cheek well healed.\par All sutures removed.\par No sign of infection.

## 2023-02-01 NOTE — HISTORY OF PRESENT ILLNESS
[FreeTextEntry1] : Doing well postop.\par Reports she did leave the house despite initial concerns about how the sutures looked.\par Using ointment.

## 2023-02-08 ENCOUNTER — OUTPATIENT (OUTPATIENT)
Dept: OUTPATIENT SERVICES | Facility: HOSPITAL | Age: 58
LOS: 1 days | End: 2023-02-08
Payer: COMMERCIAL

## 2023-02-08 ENCOUNTER — APPOINTMENT (OUTPATIENT)
Dept: PLASTIC SURGERY | Facility: CLINIC | Age: 58
End: 2023-02-08
Payer: COMMERCIAL

## 2023-02-08 VITALS
TEMPERATURE: 97.2 F | HEIGHT: 63 IN | DIASTOLIC BLOOD PRESSURE: 80 MMHG | WEIGHT: 123 LBS | SYSTOLIC BLOOD PRESSURE: 120 MMHG | RESPIRATION RATE: 16 BRPM | HEART RATE: 69 BPM | BODY MASS INDEX: 21.79 KG/M2 | OXYGEN SATURATION: 97 %

## 2023-02-08 DIAGNOSIS — C50.919 MALIGNANT NEOPLASM OF UNSPECIFIED SITE OF UNSPECIFIED FEMALE BREAST: ICD-10-CM

## 2023-02-08 PROCEDURE — 99214 OFFICE O/P EST MOD 30 MIN: CPT

## 2023-02-08 NOTE — REASON FOR VISIT
[Follow-Up: _____] : a [unfilled] follow-up visit [FreeTextEntry1] : Patient states she is doing well no issues to report.

## 2023-02-08 NOTE — HISTORY OF PRESENT ILLNESS
[FreeTextEntry1] : Pt reports that her breasts are doing better.\par She feels that the incision on the face is looking great.

## 2023-02-08 NOTE — ASSESSMENT
[FreeTextEntry1] : Doing well s/p Mohs repair and s/p bilateral breast reconstruction.\par I reviewed scar care as follows:\par \par Scars tend to heal fairly quickly (in 4-14 days depending on the site), then mature slowly over time. The vast majority of improvement occurs in the first 2-3 months, with the pink/red scar becoming softer, more pliable and lighter in color (depending upon skin tone). At 6 months, the scar appears lighter and at approximately one year, the scar reaches maximum improvement (with a range from 9-18 months).\par Scar care should include skin moisturizer, sunscreen, and if desired, silicone gel. \par Silicone gel use should begin after all scabs and crust are gone and the scars are smooth and dry, usually about 3 weeks. The silicone gel should be used twice a day for a treatment course of three months to achieve benefit. Sunscreen and/or makeup can be applied after the silicone gel has been applied and allowed to dry.\par \par Regarding the breasts, there might not be enough soft tissue laxity on the left to creat nipple flaps. Pt is unsure if she wants any projection anyway. She will think about it. We also discussed NAC tattoos including "3D" representation of the nipples. \par Follow up in 2 months to discuss further.

## 2023-02-08 NOTE — PHYSICAL EXAM
[de-identified] : Right medial cheek well healed.\par  [de-identified] : NL respiratory effort noted  [de-identified] : Bilateral recon breasts slightly softer.\par Left upper pole improved, but the lower pole is still stiff with thin coverage.\par No erythema, No edema.

## 2023-02-14 ENCOUNTER — RESULT REVIEW (OUTPATIENT)
Age: 58
End: 2023-02-14

## 2023-02-14 ENCOUNTER — APPOINTMENT (OUTPATIENT)
Age: 58
End: 2023-02-14
Payer: COMMERCIAL

## 2023-02-14 VITALS
BODY MASS INDEX: 21.61 KG/M2 | RESPIRATION RATE: 16 BRPM | TEMPERATURE: 97.9 F | WEIGHT: 122 LBS | HEART RATE: 52 BPM | SYSTOLIC BLOOD PRESSURE: 147 MMHG | DIASTOLIC BLOOD PRESSURE: 83 MMHG | OXYGEN SATURATION: 99 %

## 2023-02-14 LAB
BASOPHILS # BLD AUTO: 0.02 K/UL — SIGNIFICANT CHANGE UP (ref 0–0.2)
BASOPHILS NFR BLD AUTO: 0.4 % — SIGNIFICANT CHANGE UP (ref 0–2)
EOSINOPHIL # BLD AUTO: 0.05 K/UL — SIGNIFICANT CHANGE UP (ref 0–0.5)
EOSINOPHIL NFR BLD AUTO: 1 % — SIGNIFICANT CHANGE UP (ref 0–6)
HCT VFR BLD CALC: 37.6 % — SIGNIFICANT CHANGE UP (ref 34.5–45)
HGB BLD-MCNC: 12.7 G/DL — SIGNIFICANT CHANGE UP (ref 11.5–15.5)
IMM GRANULOCYTES NFR BLD AUTO: 0.4 % — SIGNIFICANT CHANGE UP (ref 0–0.9)
LYMPHOCYTES # BLD AUTO: 1.52 K/UL — SIGNIFICANT CHANGE UP (ref 1–3.3)
LYMPHOCYTES # BLD AUTO: 30 % — SIGNIFICANT CHANGE UP (ref 13–44)
MCHC RBC-ENTMCNC: 32.7 PG — SIGNIFICANT CHANGE UP (ref 27–34)
MCHC RBC-ENTMCNC: 33.8 GM/DL — SIGNIFICANT CHANGE UP (ref 32–36)
MCV RBC AUTO: 96.9 FL — SIGNIFICANT CHANGE UP (ref 80–100)
MONOCYTES # BLD AUTO: 0.34 K/UL — SIGNIFICANT CHANGE UP (ref 0–0.9)
MONOCYTES NFR BLD AUTO: 6.7 % — SIGNIFICANT CHANGE UP (ref 2–14)
NEUTROPHILS # BLD AUTO: 3.12 K/UL — SIGNIFICANT CHANGE UP (ref 1.8–7.4)
NEUTROPHILS NFR BLD AUTO: 61.5 % — SIGNIFICANT CHANGE UP (ref 43–77)
NRBC # BLD: 0 /100 WBCS — SIGNIFICANT CHANGE UP (ref 0–0)
PLATELET # BLD AUTO: 273 K/UL — SIGNIFICANT CHANGE UP (ref 150–400)
RBC # BLD: 3.88 M/UL — SIGNIFICANT CHANGE UP (ref 3.8–5.2)
RBC # FLD: 11.9 % — SIGNIFICANT CHANGE UP (ref 10.3–14.5)
WBC # BLD: 5.07 K/UL — SIGNIFICANT CHANGE UP (ref 3.8–10.5)
WBC # FLD AUTO: 5.07 K/UL — SIGNIFICANT CHANGE UP (ref 3.8–10.5)

## 2023-02-14 PROCEDURE — 99213 OFFICE O/P EST LOW 20 MIN: CPT

## 2023-02-14 PROCEDURE — 85027 COMPLETE CBC AUTOMATED: CPT

## 2023-02-14 NOTE — HISTORY OF PRESENT ILLNESS
[de-identified] : Ms. Che was diagnosed with left breast ER/KS+, Her2 negative invasive ductal carcinoma on ultrasound-guided biopsy, which was completed on April 22, 2021.  Core biopsy was ER/KS+, Her2 negative by [facility]. \par \par Emily underwent bilateral mastectomy (right was prophylactic) with sentinel node biopsy and reconstruction with tissue expanders on May, 21, 2021. Final surgical pathology revealed left invasive ductal carcinoma (Grade 2), ER/KS+, Her2-, measuring 2.5 x 1.8 x 1.5cm. The anterior margin was positive. [place] lymph node was negative. There was no evidence of DCIS or LCIS. [id number].\par \par Oncotype testing revealed a recurrence score of 11.  She is post-menopausal. Myriad testing was negative.\par \par Emily completed adjuvant radiation to the left breast with Dr. Kelley in mid-September, 2021. She developed some skin erythema and breakdown, but overall did very well. [de-identified] : Almost complete with reconstruction. Pending discussion regarding nipple projection vs tattooing. \par \par Tolerating AI without issue. Reports essentially no side effects. No new medications otherwise. No changes in medical status.

## 2023-02-14 NOTE — PHYSICAL EXAM
[Fully active, able to carry on all pre-disease performance without restriction] : Status 0 - Fully active, able to carry on all pre-disease performance without restriction [Normal] : affect appropriate [de-identified] : anicteric [de-identified] : b/l mastectomies with implants. Left breast s/p fat grafting. Some skin contour nodularity due to grafting

## 2023-02-14 NOTE — RESULTS/DATA
[FreeTextEntry1] : Ms. Che is a pleasant 57 year-old woman with [id number] ER/LA+, Her2- left breast cancer status-post bilateral mastectomy and sentinel lymph node biopsy, now s/p RT, here in follow-up on anastrozole.

## 2023-02-15 LAB
ALBUMIN SERPL ELPH-MCNC: 4.7 G/DL
ALP BLD-CCNC: 67 U/L
ALT SERPL-CCNC: 10 U/L
ANION GAP SERPL CALC-SCNC: 14 MMOL/L
AST SERPL-CCNC: 25 U/L
BILIRUB SERPL-MCNC: 0.5 MG/DL
BUN SERPL-MCNC: 8 MG/DL
CALCIUM SERPL-MCNC: 10.2 MG/DL
CHLORIDE SERPL-SCNC: 100 MMOL/L
CO2 SERPL-SCNC: 24 MMOL/L
CREAT SERPL-MCNC: 0.72 MG/DL
EGFR: 97 ML/MIN/1.73M2
FERRITIN SERPL-MCNC: 235 NG/ML
FOLATE SERPL-MCNC: >20 NG/ML
GLUCOSE SERPL-MCNC: 101 MG/DL
IRON SATN MFR SERPL: 33 %
IRON SERPL-MCNC: 105 UG/DL
POTASSIUM SERPL-SCNC: 4.2 MMOL/L
PROT SERPL-MCNC: 7.1 G/DL
SODIUM SERPL-SCNC: 138 MMOL/L
TIBC SERPL-MCNC: 316 UG/DL
TRANSFERRIN SERPL-MCNC: 265 MG/DL
UIBC SERPL-MCNC: 210 UG/DL
VIT B12 SERPL-MCNC: 274 PG/ML

## 2023-02-20 ENCOUNTER — NON-APPOINTMENT (OUTPATIENT)
Age: 58
End: 2023-02-20

## 2023-03-14 ENCOUNTER — APPOINTMENT (OUTPATIENT)
Dept: CARDIOLOGY | Facility: CLINIC | Age: 58
End: 2023-03-14

## 2023-04-05 ENCOUNTER — NON-APPOINTMENT (OUTPATIENT)
Age: 58
End: 2023-04-05

## 2023-04-05 ENCOUNTER — APPOINTMENT (OUTPATIENT)
Dept: PLASTIC SURGERY | Facility: CLINIC | Age: 58
End: 2023-04-05
Payer: COMMERCIAL

## 2023-04-05 VITALS
TEMPERATURE: 97.8 F | HEIGHT: 63 IN | DIASTOLIC BLOOD PRESSURE: 74 MMHG | WEIGHT: 122 LBS | HEART RATE: 69 BPM | BODY MASS INDEX: 21.62 KG/M2 | SYSTOLIC BLOOD PRESSURE: 128 MMHG | OXYGEN SATURATION: 98 % | RESPIRATION RATE: 16 BRPM

## 2023-04-05 DIAGNOSIS — Z90.13 ACQUIRED ABSENCE OF BILATERAL BREASTS AND NIPPLES: ICD-10-CM

## 2023-04-05 DIAGNOSIS — N65.0 DEFORMITY OF RECONSTRUCTED BREAST: ICD-10-CM

## 2023-04-05 DIAGNOSIS — L90.5 SCAR CONDITIONS AND FIBROSIS OF SKIN: ICD-10-CM

## 2023-04-05 PROCEDURE — 99214 OFFICE O/P EST MOD 30 MIN: CPT

## 2023-04-05 NOTE — REASON FOR VISIT
[Follow-Up: _____] : a [unfilled] follow-up visit [FreeTextEntry1] : Patient states she is doing great and has no issues to report.

## 2023-04-05 NOTE — HISTORY OF PRESENT ILLNESS
[FreeTextEntry1] : Pt reports she is well in general, but has some questions.\par Regarding the right cheek, she is worried about a bump in the center of the scar.\par Regarding the breasts, she would like to know if there is anything else that can be done to improve the contour of the left breast further. She is asking if filler injections may help.\par \par She did try some stick on nipples to see how she felt about the appearance and she reports they really made a diference. She does not want nipple projection, but she is thinking about getting the NAC tattoos to feel more whole and "not like a Jessica".

## 2023-04-05 NOTE — ASSESSMENT
[FreeTextEntry1] : Doing well overall.\par \par The cheek scar is at the expected stage of maturation for 2 months postop. She is using silicone gel and will add massage.\par \par The left recon breast in improved, but continues to have fair soft tissue coverage of the lower pole.\par This might be ameliorated with additional fat grafting, but this would work better if the implants were downsized, in order to tack pressure off of the area and allow injection of additional subcutaneous fat. She is not interested in changing the size of the implants though, as she is happy with the current size.\par She is clear that she does not want nipple projection (this was not really feasible on the left before, but the tissues are softer now), but is pretty sure that she would want the NAC reconstruction with tattoos. She will let us know about that.\par \par We discussed long term monitoring of her implants for rupture. This would be with MRI and or US and would begin 5-6 years after implant placement. As her implants were placed in February of 2022, this would begin in 2027/8.\par She should follow up in one year, or earlier for NAC tattoos.

## 2023-05-04 ENCOUNTER — APPOINTMENT (OUTPATIENT)
Dept: BREAST CENTER | Facility: CLINIC | Age: 58
End: 2023-05-04
Payer: COMMERCIAL

## 2023-05-04 VITALS
TEMPERATURE: 97.3 F | HEIGHT: 63 IN | HEART RATE: 57 BPM | WEIGHT: 121 LBS | SYSTOLIC BLOOD PRESSURE: 145 MMHG | DIASTOLIC BLOOD PRESSURE: 75 MMHG | BODY MASS INDEX: 21.44 KG/M2

## 2023-05-04 PROCEDURE — 99214 OFFICE O/P EST MOD 30 MIN: CPT

## 2023-05-04 PROCEDURE — 93702 BIS XTRACELL FLUID ANALYSIS: CPT | Mod: NC

## 2023-05-04 NOTE — HISTORY OF PRESENT ILLNESS
[FreeTextEntry1] : PCP is Bethany Wright MD.\par \par Pt is a 57 year old MYRIAD negative white female here for follow up. She is s/p 5/21/21 R prophylactic and L mastectomy w/ SLNbx and bilat recon w/ TE per Dr. Herrera for Surgical PATH: R negative, L IDC mod diff, anterior margin + invasive ca, posterior margin 1.0mm from invasive ca, pT2N0/1Mx, measures 2.5x1.8x1.5cm, Grade 2, -DCIS, -LCIS, - LVI, focal ALH, FCC w/ apocrine metaplasia, ER+ >95%, [place]+ >95%, Her2 CISH-. Now had implants and recently had fat grafting. \par \par Implant was removed due to rupture prior to EBRT for positive margins per Dr. Kelley. 2/22, had left implant replaced and subsequent fat grafting. \par Oncotype 11, low. Pt on anastrazole per Dr. GONZALEZ. \par Hx of bilat breast implants placed 2004. \par Had Mohs procedure with Dr. Herrera for basal cell carcinoma of R cheek. \par \par Fhx: Breast CA - mother 85, maunt-unknown ag at diagnosis. Not AJ. \par \par

## 2023-05-04 NOTE — ASSESSMENT
[FreeTextEntry1] : PCP is Bethany Wright MD.\par \par Pt is a 57 year old MYRIAD negative white female here for follow up. She is s/p 5/21/21 R prophylactic and L mastectomy w/ SLNbx and bilat recon w/ TE per Dr. Herrera for Surgical PATH: R negative, L IDC mod diff, anterior margin + invasive ca, posterior margin 1.0mm from invasive ca, pT2N0/1Mx, measures 2.5x1.8x1.5cm, Grade 2, -DCIS, -LCIS, - LVI, focal ALH, FCC w/ apocrine metaplasia, ER+ >95%, [place]+ >95%, Her2 CISH-. Now had implants and recently had fat grafting. \par \par Implant was removed due to rupture prior to EBRT for positive margins per Dr. Kelley. 2/22, had left implant replaced and subsequent fat grafting. \par Oncotype 11, low. Pt on anastrazole per Dr. GONZALEZ. \par Hx of bilat breast implants placed 2004. \par Had Mohs procedure with Dr. Herrera for basal cell carcinoma of R cheek. \par \par Fhx: Breast CA - mother 85, maunt-unknown ag at diagnosis. Not AJ. \par \par CBE: Bilat mastectomy with implants. No NAC recon (pt not sure she wants it). No lymphadenopathy. Full ROM and no LE.\par  F/u w/ Dr. Kellogg, on anastrazole. Still occasionally sees Dr. Kelley. had x2 fat grafting per Dr. Herrera and pt unsure if she want NAC reconstruction. \par Her daughter is graduating and has spray tan. Is leaning towards no NAC recon. \par Pt works at a [employer] in [place], she would be interested in working with exercise oncology. \par

## 2023-05-18 ENCOUNTER — OFFICE (OUTPATIENT)
Dept: URBAN - METROPOLITAN AREA CLINIC 97 | Facility: CLINIC | Age: 58
Setting detail: OPHTHALMOLOGY
End: 2023-05-18
Payer: COMMERCIAL

## 2023-05-18 DIAGNOSIS — H35.363: ICD-10-CM

## 2023-05-18 DIAGNOSIS — H00.14: ICD-10-CM

## 2023-05-18 DIAGNOSIS — H43.813: ICD-10-CM

## 2023-05-18 PROCEDURE — 92134 CPTRZ OPH DX IMG PST SGM RTA: CPT | Performed by: OPHTHALMOLOGY

## 2023-05-18 PROCEDURE — 92014 COMPRE OPH EXAM EST PT 1/>: CPT | Performed by: OPHTHALMOLOGY

## 2023-05-18 ASSESSMENT — KERATOMETRY
OD_K1POWER_DIOPTERS: 44.25
METHOD_AUTO_MANUAL: AUTO
OS_K2POWER_DIOPTERS: 45.00
OD_AXISANGLE_DEGREES: 054
OS_K1POWER_DIOPTERS: 44.75
OS_AXISANGLE_DEGREES: 096
OD_K2POWER_DIOPTERS: 44.75

## 2023-05-18 ASSESSMENT — REFRACTION_MANIFEST
OD_ADD: +2.50
OD_VA1: 20/20-
OS_SPHERE: -4.00
OD_CYLINDER: +0.50
OS_CYLINDER: SPH
OS_ADD: +2.50
OS_VA2: 20/20(J1+)
OD_SPHERE: -3.75
OD_AXIS: 030
OU_VA: 20/20-
OS_VA1: 20/20-2
OD_VA2: 20/20(J1+)

## 2023-05-18 ASSESSMENT — AXIALLENGTH_DERIVED
OD_AL: 24.6321
OD_AL: 24.4739
OS_AL: 24.4306

## 2023-05-18 ASSESSMENT — REFRACTION_CURRENTRX
OD_AXIS: 056
OS_AXIS: 058
OD_SPHERE: -4.50
OS_VPRISM_DIRECTION: PROGS
OD_VPRISM_DIRECTION: PROGS
OS_CYLINDER: +0.25
OS_ADD: +2.75
OS_SPHERE: -4.50
OS_OVR_VA: 20/
OD_OVR_VA: 20/
OD_ADD: +2.75
OD_CYLINDER: +0.25

## 2023-05-18 ASSESSMENT — REFRACTION_AUTOREFRACTION
OS_CYLINDER: +0.25
OS_SPHERE: -3.50
OS_AXIS: 045
OD_SPHERE: -3.25
OD_AXIS: 030
OD_CYLINDER: +0.25

## 2023-05-18 ASSESSMENT — CONFRONTATIONAL VISUAL FIELD TEST (CVF)
OS_FINDINGS: FULL
OD_FINDINGS: FULL

## 2023-05-18 ASSESSMENT — SPHEQUIV_DERIVED
OD_SPHEQUIV: -3.5
OS_SPHEQUIV: -3.375
OD_SPHEQUIV: -3.125

## 2023-05-18 ASSESSMENT — VISUAL ACUITY
OS_BCVA: 20/25-
OD_BCVA: 20/20

## 2023-05-31 ENCOUNTER — OUTPATIENT (OUTPATIENT)
Dept: OUTPATIENT SERVICES | Facility: HOSPITAL | Age: 58
LOS: 1 days | End: 2023-05-31
Payer: COMMERCIAL

## 2023-05-31 DIAGNOSIS — C50.919 MALIGNANT NEOPLASM OF UNSPECIFIED SITE OF UNSPECIFIED FEMALE BREAST: ICD-10-CM

## 2023-06-06 ENCOUNTER — RESULT REVIEW (OUTPATIENT)
Age: 58
End: 2023-06-06

## 2023-06-06 ENCOUNTER — APPOINTMENT (OUTPATIENT)
Age: 58
End: 2023-06-06
Payer: COMMERCIAL

## 2023-06-06 VITALS
DIASTOLIC BLOOD PRESSURE: 79 MMHG | OXYGEN SATURATION: 99 % | BODY MASS INDEX: 21.44 KG/M2 | SYSTOLIC BLOOD PRESSURE: 134 MMHG | HEART RATE: 73 BPM | WEIGHT: 121 LBS | HEIGHT: 63 IN | TEMPERATURE: 98 F

## 2023-06-06 LAB
BASOPHILS # BLD AUTO: 0.03 K/UL — SIGNIFICANT CHANGE UP (ref 0–0.2)
BASOPHILS NFR BLD AUTO: 0.4 % — SIGNIFICANT CHANGE UP (ref 0–2)
EOSINOPHIL # BLD AUTO: 0.03 K/UL — SIGNIFICANT CHANGE UP (ref 0–0.5)
EOSINOPHIL NFR BLD AUTO: 0.4 % — SIGNIFICANT CHANGE UP (ref 0–6)
HCT VFR BLD CALC: 38.3 % — SIGNIFICANT CHANGE UP (ref 34.5–45)
HGB BLD-MCNC: 12.8 G/DL — SIGNIFICANT CHANGE UP (ref 11.5–15.5)
IMM GRANULOCYTES NFR BLD AUTO: 0.3 % — SIGNIFICANT CHANGE UP (ref 0–0.9)
LYMPHOCYTES # BLD AUTO: 1 K/UL — SIGNIFICANT CHANGE UP (ref 1–3.3)
LYMPHOCYTES # BLD AUTO: 14.1 % — SIGNIFICANT CHANGE UP (ref 13–44)
MCHC RBC-ENTMCNC: 33.4 GM/DL — SIGNIFICANT CHANGE UP (ref 32–36)
MCHC RBC-ENTMCNC: 33.9 PG — SIGNIFICANT CHANGE UP (ref 27–34)
MCV RBC AUTO: 101.3 FL — HIGH (ref 80–100)
MONOCYTES # BLD AUTO: 0.36 K/UL — SIGNIFICANT CHANGE UP (ref 0–0.9)
MONOCYTES NFR BLD AUTO: 5.1 % — SIGNIFICANT CHANGE UP (ref 2–14)
NEUTROPHILS # BLD AUTO: 5.65 K/UL — SIGNIFICANT CHANGE UP (ref 1.8–7.4)
NEUTROPHILS NFR BLD AUTO: 79.7 % — HIGH (ref 43–77)
NRBC # BLD: 0 /100 WBCS — SIGNIFICANT CHANGE UP (ref 0–0)
PLATELET # BLD AUTO: 213 K/UL — SIGNIFICANT CHANGE UP (ref 150–400)
RBC # BLD: 3.78 M/UL — LOW (ref 3.8–5.2)
RBC # FLD: 11.8 % — SIGNIFICANT CHANGE UP (ref 10.3–14.5)
WBC # BLD: 7.09 K/UL — SIGNIFICANT CHANGE UP (ref 3.8–10.5)
WBC # FLD AUTO: 7.09 K/UL — SIGNIFICANT CHANGE UP (ref 3.8–10.5)

## 2023-06-06 PROCEDURE — 99214 OFFICE O/P EST MOD 30 MIN: CPT

## 2023-06-06 PROCEDURE — 85027 COMPLETE CBC AUTOMATED: CPT

## 2023-06-07 LAB
25(OH)D3 SERPL-MCNC: 36.7 NG/ML
ALBUMIN SERPL ELPH-MCNC: 4.7 G/DL
ALP BLD-CCNC: 71 U/L
ALT SERPL-CCNC: 8 U/L
ANION GAP SERPL CALC-SCNC: 14 MMOL/L
AST SERPL-CCNC: 27 U/L
BILIRUB SERPL-MCNC: 0.5 MG/DL
BUN SERPL-MCNC: 14 MG/DL
CALCIUM SERPL-MCNC: 10.2 MG/DL
CHLORIDE SERPL-SCNC: 100 MMOL/L
CO2 SERPL-SCNC: 24 MMOL/L
CREAT SERPL-MCNC: 0.74 MG/DL
EGFR: 94 ML/MIN/1.73M2
GLUCOSE SERPL-MCNC: 125 MG/DL
POTASSIUM SERPL-SCNC: 4.3 MMOL/L
PROT SERPL-MCNC: 7.2 G/DL
SODIUM SERPL-SCNC: 137 MMOL/L

## 2023-07-21 NOTE — RESULTS/DATA
[FreeTextEntry1] : Ms. Che is a pleasant 57 year-old woman with [id number] ER/MT+, Her2- left breast cancer status-post bilateral mastectomy and sentinel lymph node biopsy, now s/p RT, here in follow-up on anastrozole.

## 2023-07-21 NOTE — PHYSICAL EXAM
[Fully active, able to carry on all pre-disease performance without restriction] : Status 0 - Fully active, able to carry on all pre-disease performance without restriction [Normal] : affect appropriate [de-identified] : anicteric [de-identified] : b/l mastectomies with implants. Left breast s/p fat grafting.

## 2023-07-21 NOTE — HISTORY OF PRESENT ILLNESS
[de-identified] : Ms. Che was diagnosed with left breast ER/VA+, Her2 negative invasive ductal carcinoma on ultrasound-guided biopsy, which was completed on April 22, 2021.  Core biopsy was ER/VA+, Her2 negative by [facility]. \par \par Emily underwent bilateral mastectomy (right was prophylactic) with sentinel node biopsy and reconstruction with tissue expanders on May, 21, 2021. Final surgical pathology revealed left invasive ductal carcinoma (Grade 2), ER/VA+, Her2-, measuring 2.5 x 1.8 x 1.5cm. The anterior margin was positive. [place] lymph node was negative. There was no evidence of DCIS or LCIS. [id number].\par \par Oncotype testing revealed a recurrence score of 11.  She is post-menopausal. Myriad testing was negative.\par \par Emily completed adjuvant radiation to the left breast with Dr. Kelley in mid-September, 2021. She developed some skin erythema and breakdown, but overall did very well. [de-identified] : Tolerating AI without issue. Reports essentially no side effects. No new medications otherwise. No changes in medical status. Breast reconstruction plans are complete.

## 2023-09-18 ENCOUNTER — TRANSCRIPTION ENCOUNTER (OUTPATIENT)
Age: 58
End: 2023-09-18

## 2023-11-02 ENCOUNTER — APPOINTMENT (OUTPATIENT)
Dept: BREAST CENTER | Facility: CLINIC | Age: 58
End: 2023-11-02
Payer: COMMERCIAL

## 2023-11-02 VITALS
WEIGHT: 121 LBS | BODY MASS INDEX: 21.44 KG/M2 | SYSTOLIC BLOOD PRESSURE: 135 MMHG | HEART RATE: 64 BPM | DIASTOLIC BLOOD PRESSURE: 80 MMHG | HEIGHT: 63 IN | TEMPERATURE: 97.3 F

## 2023-11-02 PROCEDURE — 99214 OFFICE O/P EST MOD 30 MIN: CPT

## 2023-11-02 PROCEDURE — 93702 BIS XTRACELL FLUID ANALYSIS: CPT | Mod: NC

## 2023-11-27 ENCOUNTER — APPOINTMENT (OUTPATIENT)
Dept: RADIOLOGY | Facility: CLINIC | Age: 58
End: 2023-11-27
Payer: COMMERCIAL

## 2023-11-27 PROCEDURE — 77080 DXA BONE DENSITY AXIAL: CPT

## 2024-01-10 ENCOUNTER — OUTPATIENT (OUTPATIENT)
Dept: OUTPATIENT SERVICES | Facility: HOSPITAL | Age: 59
LOS: 1 days | End: 2024-01-10
Payer: COMMERCIAL

## 2024-01-10 DIAGNOSIS — C50.919 MALIGNANT NEOPLASM OF UNSPECIFIED SITE OF UNSPECIFIED FEMALE BREAST: ICD-10-CM

## 2024-01-12 ENCOUNTER — RESULT REVIEW (OUTPATIENT)
Age: 59
End: 2024-01-12

## 2024-01-12 ENCOUNTER — APPOINTMENT (OUTPATIENT)
Age: 59
End: 2024-01-12
Payer: COMMERCIAL

## 2024-01-12 VITALS
HEART RATE: 67 BPM | TEMPERATURE: 98 F | SYSTOLIC BLOOD PRESSURE: 138 MMHG | WEIGHT: 125 LBS | BODY MASS INDEX: 22.15 KG/M2 | DIASTOLIC BLOOD PRESSURE: 83 MMHG | OXYGEN SATURATION: 98 % | HEIGHT: 63 IN

## 2024-01-12 LAB
BASOPHILS # BLD AUTO: 0.04 K/UL — SIGNIFICANT CHANGE UP (ref 0–0.2)
BASOPHILS # BLD AUTO: 0.04 K/UL — SIGNIFICANT CHANGE UP (ref 0–0.2)
BASOPHILS NFR BLD AUTO: 0.5 % — SIGNIFICANT CHANGE UP (ref 0–2)
BASOPHILS NFR BLD AUTO: 0.5 % — SIGNIFICANT CHANGE UP (ref 0–2)
EOSINOPHIL # BLD AUTO: 0.03 K/UL — SIGNIFICANT CHANGE UP (ref 0–0.5)
EOSINOPHIL # BLD AUTO: 0.03 K/UL — SIGNIFICANT CHANGE UP (ref 0–0.5)
EOSINOPHIL NFR BLD AUTO: 0.4 % — SIGNIFICANT CHANGE UP (ref 0–6)
EOSINOPHIL NFR BLD AUTO: 0.4 % — SIGNIFICANT CHANGE UP (ref 0–6)
HCT VFR BLD CALC: 36.6 % — SIGNIFICANT CHANGE UP (ref 34.5–45)
HCT VFR BLD CALC: 36.6 % — SIGNIFICANT CHANGE UP (ref 34.5–45)
HGB BLD-MCNC: 12.7 G/DL — SIGNIFICANT CHANGE UP (ref 11.5–15.5)
HGB BLD-MCNC: 12.7 G/DL — SIGNIFICANT CHANGE UP (ref 11.5–15.5)
IMM GRANULOCYTES NFR BLD AUTO: 0.3 % — SIGNIFICANT CHANGE UP (ref 0–0.9)
IMM GRANULOCYTES NFR BLD AUTO: 0.3 % — SIGNIFICANT CHANGE UP (ref 0–0.9)
LYMPHOCYTES # BLD AUTO: 1.34 K/UL — SIGNIFICANT CHANGE UP (ref 1–3.3)
LYMPHOCYTES # BLD AUTO: 1.34 K/UL — SIGNIFICANT CHANGE UP (ref 1–3.3)
LYMPHOCYTES # BLD AUTO: 16.8 % — SIGNIFICANT CHANGE UP (ref 13–44)
LYMPHOCYTES # BLD AUTO: 16.8 % — SIGNIFICANT CHANGE UP (ref 13–44)
MCHC RBC-ENTMCNC: 33.4 PG — SIGNIFICANT CHANGE UP (ref 27–34)
MCHC RBC-ENTMCNC: 33.4 PG — SIGNIFICANT CHANGE UP (ref 27–34)
MCHC RBC-ENTMCNC: 34.7 GM/DL — SIGNIFICANT CHANGE UP (ref 32–36)
MCHC RBC-ENTMCNC: 34.7 GM/DL — SIGNIFICANT CHANGE UP (ref 32–36)
MCV RBC AUTO: 96.3 FL — SIGNIFICANT CHANGE UP (ref 80–100)
MCV RBC AUTO: 96.3 FL — SIGNIFICANT CHANGE UP (ref 80–100)
MONOCYTES # BLD AUTO: 0.44 K/UL — SIGNIFICANT CHANGE UP (ref 0–0.9)
MONOCYTES # BLD AUTO: 0.44 K/UL — SIGNIFICANT CHANGE UP (ref 0–0.9)
MONOCYTES NFR BLD AUTO: 5.5 % — SIGNIFICANT CHANGE UP (ref 2–14)
MONOCYTES NFR BLD AUTO: 5.5 % — SIGNIFICANT CHANGE UP (ref 2–14)
NEUTROPHILS # BLD AUTO: 6.09 K/UL — SIGNIFICANT CHANGE UP (ref 1.8–7.4)
NEUTROPHILS # BLD AUTO: 6.09 K/UL — SIGNIFICANT CHANGE UP (ref 1.8–7.4)
NEUTROPHILS NFR BLD AUTO: 76.5 % — SIGNIFICANT CHANGE UP (ref 43–77)
NEUTROPHILS NFR BLD AUTO: 76.5 % — SIGNIFICANT CHANGE UP (ref 43–77)
NRBC # BLD: 0 /100 WBCS — SIGNIFICANT CHANGE UP (ref 0–0)
NRBC # BLD: 0 /100 WBCS — SIGNIFICANT CHANGE UP (ref 0–0)
PLATELET # BLD AUTO: 234 K/UL — SIGNIFICANT CHANGE UP (ref 150–400)
PLATELET # BLD AUTO: 234 K/UL — SIGNIFICANT CHANGE UP (ref 150–400)
RBC # BLD: 3.8 M/UL — SIGNIFICANT CHANGE UP (ref 3.8–5.2)
RBC # BLD: 3.8 M/UL — SIGNIFICANT CHANGE UP (ref 3.8–5.2)
RBC # FLD: 11.8 % — SIGNIFICANT CHANGE UP (ref 10.3–14.5)
RBC # FLD: 11.8 % — SIGNIFICANT CHANGE UP (ref 10.3–14.5)
WBC # BLD: 7.96 K/UL — SIGNIFICANT CHANGE UP (ref 3.8–10.5)
WBC # BLD: 7.96 K/UL — SIGNIFICANT CHANGE UP (ref 3.8–10.5)
WBC # FLD AUTO: 7.96 K/UL — SIGNIFICANT CHANGE UP (ref 3.8–10.5)
WBC # FLD AUTO: 7.96 K/UL — SIGNIFICANT CHANGE UP (ref 3.8–10.5)

## 2024-01-12 PROCEDURE — 85027 COMPLETE CBC AUTOMATED: CPT

## 2024-01-12 PROCEDURE — 99214 OFFICE O/P EST MOD 30 MIN: CPT

## 2024-01-13 LAB
ALBUMIN SERPL ELPH-MCNC: 5.1 G/DL
ALP BLD-CCNC: 71 U/L
ALT SERPL-CCNC: 12 U/L
ANION GAP SERPL CALC-SCNC: 14 MMOL/L
AST SERPL-CCNC: 32 U/L
BILIRUB SERPL-MCNC: 0.5 MG/DL
BUN SERPL-MCNC: 12 MG/DL
CALCIUM SERPL-MCNC: 10 MG/DL
CHLORIDE SERPL-SCNC: 96 MMOL/L
CO2 SERPL-SCNC: 24 MMOL/L
CREAT SERPL-MCNC: 0.73 MG/DL
EGFR: 95 ML/MIN/1.73M2
GLUCOSE SERPL-MCNC: 91 MG/DL
POTASSIUM SERPL-SCNC: 4.3 MMOL/L
PROT SERPL-MCNC: 7.6 G/DL
SODIUM SERPL-SCNC: 134 MMOL/L

## 2024-02-13 NOTE — RESULTS/DATA
[FreeTextEntry1] : Ms. Che is a pleasant 58 year-old woman with [id number] ER/NV+, Her2- left breast cancer status-post bilateral mastectomy and sentinel lymph node biopsy, now s/p RT, here in follow-up on anastrozole.

## 2024-02-13 NOTE — HISTORY OF PRESENT ILLNESS
[de-identified] : Ms. Che was diagnosed with left breast ER/GA+, Her2 negative invasive ductal carcinoma on ultrasound-guided biopsy, which was completed on April 22, 2021.  Core biopsy was ER/GA+, Her2 negative by [facility]. \par  \par  Emily underwent bilateral mastectomy (right was prophylactic) with sentinel node biopsy and reconstruction with tissue expanders on May, 21, 2021. Final surgical pathology revealed left invasive ductal carcinoma (Grade 2), ER/GA+, Her2-, measuring 2.5 x 1.8 x 1.5cm. The anterior margin was positive. [place] lymph node was negative. There was no evidence of DCIS or LCIS. [id number].\par  \par  Oncotype testing revealed a recurrence score of 11.  She is post-menopausal. Myriad testing was negative.\par  \par  Emily completed adjuvant radiation to the left breast with Dr. Kelley in mid-September, 2021. She developed some skin erythema and breakdown, but overall did very well. [de-identified] : No ongoing oncologic issues. Remains on AI. Osteopenia in femoral neck on DEXA from November. She denies any ongoing fevers or chills, no headache, no lumps or bumps, no weight loss, no bleeding or bruising.

## 2024-02-13 NOTE — PHYSICAL EXAM
[Fully active, able to carry on all pre-disease performance without restriction] : Status 0 - Fully active, able to carry on all pre-disease performance without restriction [Normal] : affect appropriate [de-identified] : anicteric [de-identified] : b/l mastectomies with implants. Left breast s/p fat grafting.

## 2024-05-13 ENCOUNTER — APPOINTMENT (OUTPATIENT)
Dept: BREAST CENTER | Facility: CLINIC | Age: 59
End: 2024-05-13
Payer: COMMERCIAL

## 2024-05-13 VITALS
HEART RATE: 72 BPM | DIASTOLIC BLOOD PRESSURE: 82 MMHG | OXYGEN SATURATION: 97 % | WEIGHT: 130.44 LBS | SYSTOLIC BLOOD PRESSURE: 128 MMHG | TEMPERATURE: 97.3 F | HEIGHT: 63 IN | BODY MASS INDEX: 23.11 KG/M2

## 2024-05-13 DIAGNOSIS — Z80.3 FAMILY HISTORY OF MALIGNANT NEOPLASM OF BREAST: ICD-10-CM

## 2024-05-13 DIAGNOSIS — C50.212 MALIGNANT NEOPLASM OF UPPER-INNER QUADRANT OF LEFT FEMALE BREAST: ICD-10-CM

## 2024-05-13 DIAGNOSIS — Z17.0 MALIGNANT NEOPLASM OF UPPER-INNER QUADRANT OF LEFT FEMALE BREAST: ICD-10-CM

## 2024-05-13 PROCEDURE — 99214 OFFICE O/P EST MOD 30 MIN: CPT | Mod: 25

## 2024-05-13 PROCEDURE — 93702 BIS XTRACELL FLUID ANALYSIS: CPT | Mod: NC

## 2024-05-13 NOTE — HISTORY OF PRESENT ILLNESS
[FreeTextEntry1] : PCP is Bethany Wright MD.  Pt is a 58 year old MYRIAD negative white female here for follow up. She is s/p 5/21/21 R prophylactic and L mastectomy w/ SLNbx and bilat recon w/ TE per Dr. Herrera for Surgical PATH: R negative, L IDC mod diff, anterior margin + invasive ca, posterior margin 1.0mm from invasive ca, pT2N0/1Mx, measures 2.5x1.8x1.5cm, Grade 2, -DCIS, -LCIS, - LVI, focal ALH, FCC w/ apocrine metaplasia, ER+ >95%, VT+ >95%, Her2 CISH-. Now has implants with no NAC recon.  Implant was removed due to rupture prior to EBRT for positive margins per Dr. Kelley. 2/22, had left implant replaced and subsequent fat grafting. Oncotype 11, low. Pt on anastrozole per Dr. GONZALEZ, she notes not side effects or issues.  She feels ROM of left shoulder is not what it used to be, questionable cording per patient. She is a  with a high activity level.  Hx of bilat breast implants placed 2004. Had Mohs procedure with Dr. Herrera for basal cell carcinoma of R cheek.  Fhx: Breast CA - mother 85, maunt-unknown ag at diagnosis. Not AJ.

## 2024-05-13 NOTE — REVIEW OF SYSTEMS
[Negative] : Heme/Lymph [FreeTextEntry9] : pt notes she does not have full ROM of L shoulder, has some cording. Recently injured left shoulder muscle

## 2024-05-13 NOTE — PROCEDURE
[FreeTextEntry1] : Brunilda measurement  [FreeTextEntry2] : Lymphedema analysis [FreeTextEntry3] : The patient had a follow up SOZO measurement which I reviewed today. The score is within normal limits, see flowsheet. Bioimpedance spectroscopy helps identify the onset of lymphedema in an arm or leg before patients experience noticeable swelling. Research has shown that the early detection of lymphedema using L-Dex combined with treatment can reduce progression to chronic lymphedema by 95% in breast cancer patients. Whenever possible, patients are tested for baseline L-Dex score before cancer treatment begins and then are reassessed during regular follow-up visits using the SOZO device. Otherwise, this can be started postoperatively and continued during regular follow-up visits. If the patients L-Dex score increases above normal levels, that is a sign that lymphedema is developing and a referral is made to physical therapy for further evaluation and early compression treatment. Lymphedema assessment with the SOZO L-Dex score is recommended to be done every 3 months for the first 3 years and then every 6 months for years 4 and 5 followed by annually afterwards.

## 2024-05-13 NOTE — PHYSICAL EXAM
[Normocephalic] : normocephalic [Atraumatic] : atraumatic [Supple] : supple [No Supraclavicular Adenopathy] : no supraclavicular adenopathy [No Thyromegaly] : no thyromegaly [Examined in the supine and seated position] : examined in the supine and seated position [No dominant masses] : no dominant masses in right breast  [No dominant masses] : no dominant masses left breast [No Axillary Lymphadenopathy] : no left axillary lymphadenopathy [Full ROM] : full range of motion [No Edema] : no edema [No Swelling] : no swelling [No Rashes] : no rashes [No Ulceration] : no ulceration [Asymmetrical] : asymmetrical [de-identified] :  Bilat mastectomy with implants. No NAC recon.KALANI [de-identified] : pt feels decreases ROM in L shoulder [TextEntry] : Psych: appropriate, A&Ox3 Neuro: intact grossly, gait normal

## 2024-05-13 NOTE — ASSESSMENT
[FreeTextEntry1] : PCP is Bethany Wright MD.  Pt is a 58 year old MYRIAD negative white female here for follow up. She is s/p 5/21/21 R prophylactic and L mastectomy w/ SLNbx and bilat recon w/ TE per Dr. Herrera for Surgical PATH: R negative, L IDC mod diff, anterior margin + invasive ca, posterior margin 1.0mm from invasive ca, pT2N0/1Mx, measures 2.5x1.8x1.5cm, Grade 2, -DCIS, -LCIS, - LVI, focal ALH, FCC w/ apocrine metaplasia, ER+ >95%, TX+ >95%, Her2 CISH-. Now has implants with no NAC recon.  Implant was removed due to rupture prior to EBRT for positive margins per Dr. Kelley. 2/22, had left implant replaced and subsequent fat grafting. Oncotype 11, low. Pt on anastrozole per Dr. GONZALEZ, she notes not side effects or issues.  She feels ROM of left shoulder is not what it used to be, questionable cording per patient. She is a  with a high activity level.  Hx of bilat breast implants placed 2004. Had Mohs procedure with Dr. Herrera for basal cell carcinoma of R cheek.  Fhx: Breast CA - mother 85, maunt-unknown ag at diagnosis. Not AJ.  CBE: Bilat mastectomy with implants. No NAC recon. No lymphadenopathy. Full ROM and no LE. F/u w/ Dr. Kellogg as scheduled, on anastrozole.  Given L shoulder concerns again recommended appt with Dr. Turner Pt works at a [employer] in [place] and interested in the exercise oncology program, referred to Dr. Turner for this also.   PLAN: 3 years out. F/u 1 year. Appt with Dr. Turner F/u with Dr. Kellogg (on anastrozole) as scheduled. SOZO today wnl.

## 2024-06-25 RX ORDER — ANASTROZOLE TABLETS 1 MG/1
1 TABLET ORAL
Qty: 90 | Refills: 2 | Status: ACTIVE | COMMUNITY
Start: 2021-09-27 | End: 1900-01-01

## 2024-07-08 ENCOUNTER — OUTPATIENT (OUTPATIENT)
Dept: OUTPATIENT SERVICES | Facility: HOSPITAL | Age: 59
LOS: 1 days | End: 2024-07-08
Payer: COMMERCIAL

## 2024-07-08 DIAGNOSIS — C50.919 MALIGNANT NEOPLASM OF UNSPECIFIED SITE OF UNSPECIFIED FEMALE BREAST: ICD-10-CM

## 2024-07-12 ENCOUNTER — RESULT REVIEW (OUTPATIENT)
Age: 59
End: 2024-07-12

## 2024-07-12 ENCOUNTER — APPOINTMENT (OUTPATIENT)
Dept: HEMATOLOGY ONCOLOGY | Facility: CLINIC | Age: 59
End: 2024-07-12

## 2024-07-12 VITALS
TEMPERATURE: 97.7 F | OXYGEN SATURATION: 99 % | HEART RATE: 60 BPM | SYSTOLIC BLOOD PRESSURE: 151 MMHG | DIASTOLIC BLOOD PRESSURE: 88 MMHG

## 2024-07-12 VITALS — WEIGHT: 125 LBS | BODY MASS INDEX: 22.14 KG/M2

## 2024-07-12 DIAGNOSIS — Z17.0 MALIGNANT NEOPLASM OF UPPER-INNER QUADRANT OF LEFT FEMALE BREAST: ICD-10-CM

## 2024-07-12 DIAGNOSIS — C50.212 MALIGNANT NEOPLASM OF UPPER-INNER QUADRANT OF LEFT FEMALE BREAST: ICD-10-CM

## 2024-07-12 LAB
BASOPHILS # BLD AUTO: 0.03 K/UL — SIGNIFICANT CHANGE UP (ref 0–0.2)
BASOPHILS NFR BLD AUTO: 0.4 % — SIGNIFICANT CHANGE UP (ref 0–2)
EOSINOPHIL # BLD AUTO: 0.04 K/UL — SIGNIFICANT CHANGE UP (ref 0–0.5)
EOSINOPHIL NFR BLD AUTO: 0.6 % — SIGNIFICANT CHANGE UP (ref 0–6)
HCT VFR BLD CALC: 36.6 % — SIGNIFICANT CHANGE UP (ref 34.5–45)
HGB BLD-MCNC: 12.5 G/DL — SIGNIFICANT CHANGE UP (ref 11.5–15.5)
IMM GRANULOCYTES NFR BLD AUTO: 0.3 % — SIGNIFICANT CHANGE UP (ref 0–0.9)
LYMPHOCYTES # BLD AUTO: 1.45 K/UL — SIGNIFICANT CHANGE UP (ref 1–3.3)
LYMPHOCYTES # BLD AUTO: 21.5 % — SIGNIFICANT CHANGE UP (ref 13–44)
MCHC RBC-ENTMCNC: 34 PG — SIGNIFICANT CHANGE UP (ref 27–34)
MCHC RBC-ENTMCNC: 34.2 GM/DL — SIGNIFICANT CHANGE UP (ref 32–36)
MCV RBC AUTO: 99.5 FL — SIGNIFICANT CHANGE UP (ref 80–100)
MONOCYTES # BLD AUTO: 0.42 K/UL — SIGNIFICANT CHANGE UP (ref 0–0.9)
MONOCYTES NFR BLD AUTO: 6.2 % — SIGNIFICANT CHANGE UP (ref 2–14)
NEUTROPHILS # BLD AUTO: 4.77 K/UL — SIGNIFICANT CHANGE UP (ref 1.8–7.4)
NEUTROPHILS NFR BLD AUTO: 71 % — SIGNIFICANT CHANGE UP (ref 43–77)
NRBC # BLD: 0 /100 WBCS — SIGNIFICANT CHANGE UP (ref 0–0)
PLATELET # BLD AUTO: 216 K/UL — SIGNIFICANT CHANGE UP (ref 150–400)
RBC # BLD: 3.68 M/UL — LOW (ref 3.8–5.2)
RBC # FLD: 11.8 % — SIGNIFICANT CHANGE UP (ref 10.3–14.5)
WBC # BLD: 6.73 K/UL — SIGNIFICANT CHANGE UP (ref 3.8–10.5)
WBC # FLD AUTO: 6.73 K/UL — SIGNIFICANT CHANGE UP (ref 3.8–10.5)

## 2024-07-12 PROCEDURE — 85027 COMPLETE CBC AUTOMATED: CPT

## 2024-07-12 PROCEDURE — G2211 COMPLEX E/M VISIT ADD ON: CPT | Mod: NC

## 2024-07-12 PROCEDURE — 99214 OFFICE O/P EST MOD 30 MIN: CPT

## 2024-07-14 LAB
ALBUMIN SERPL ELPH-MCNC: 5 G/DL
ALP BLD-CCNC: 76 U/L
ALT SERPL-CCNC: 14 U/L
ANION GAP SERPL CALC-SCNC: 15 MMOL/L
AST SERPL-CCNC: 35 U/L
BILIRUB SERPL-MCNC: 0.4 MG/DL
BUN SERPL-MCNC: 18 MG/DL
CALCIUM SERPL-MCNC: 10 MG/DL
CHLORIDE SERPL-SCNC: 103 MMOL/L
CO2 SERPL-SCNC: 22 MMOL/L
EGFR: 100 ML/MIN/1.73M2
GLUCOSE SERPL-MCNC: 91 MG/DL
POTASSIUM SERPL-SCNC: 4.5 MMOL/L
PROT SERPL-MCNC: 7.4 G/DL

## 2024-07-15 ENCOUNTER — OFFICE (OUTPATIENT)
Dept: URBAN - METROPOLITAN AREA CLINIC 97 | Facility: CLINIC | Age: 59
Setting detail: OPHTHALMOLOGY
End: 2024-07-15
Payer: COMMERCIAL

## 2024-07-15 DIAGNOSIS — H35.363: ICD-10-CM

## 2024-07-15 DIAGNOSIS — Z97.3: ICD-10-CM

## 2024-07-15 DIAGNOSIS — H25.13: ICD-10-CM

## 2024-07-15 DIAGNOSIS — H43.813: ICD-10-CM

## 2024-07-15 PROCEDURE — 92014 COMPRE OPH EXAM EST PT 1/>: CPT | Performed by: OPTOMETRIST

## 2024-07-15 PROCEDURE — 92134 CPTRZ OPH DX IMG PST SGM RTA: CPT | Performed by: OPTOMETRIST

## 2024-07-15 ASSESSMENT — CONFRONTATIONAL VISUAL FIELD TEST (CVF)
OS_FINDINGS: FULL
OD_FINDINGS: FULL

## 2024-12-24 ENCOUNTER — OUTPATIENT (OUTPATIENT)
Dept: OUTPATIENT SERVICES | Facility: HOSPITAL | Age: 59
LOS: 1 days | End: 2024-12-24

## 2024-12-24 DIAGNOSIS — C50.919 MALIGNANT NEOPLASM OF UNSPECIFIED SITE OF UNSPECIFIED FEMALE BREAST: ICD-10-CM

## 2024-12-25 PROBLEM — F10.90 ALCOHOL USE: Status: ACTIVE | Noted: 2021-04-27

## 2025-01-03 ENCOUNTER — APPOINTMENT (OUTPATIENT)
Dept: HEMATOLOGY ONCOLOGY | Facility: CLINIC | Age: 60
End: 2025-01-03
Payer: COMMERCIAL

## 2025-01-03 ENCOUNTER — RESULT REVIEW (OUTPATIENT)
Age: 60
End: 2025-01-03

## 2025-01-03 VITALS
HEIGHT: 63 IN | DIASTOLIC BLOOD PRESSURE: 87 MMHG | WEIGHT: 125 LBS | OXYGEN SATURATION: 99 % | SYSTOLIC BLOOD PRESSURE: 163 MMHG | BODY MASS INDEX: 22.15 KG/M2 | TEMPERATURE: 97.7 F | HEART RATE: 66 BPM

## 2025-01-03 DIAGNOSIS — C50.212 MALIGNANT NEOPLASM OF UPPER-INNER QUADRANT OF LEFT FEMALE BREAST: ICD-10-CM

## 2025-01-03 DIAGNOSIS — Z17.0 MALIGNANT NEOPLASM OF UPPER-INNER QUADRANT OF LEFT FEMALE BREAST: ICD-10-CM

## 2025-01-03 LAB
BASOPHILS # BLD AUTO: 0.04 K/UL — SIGNIFICANT CHANGE UP (ref 0–0.2)
BASOPHILS NFR BLD AUTO: 0.7 % — SIGNIFICANT CHANGE UP (ref 0–2)
EOSINOPHIL # BLD AUTO: 0.06 K/UL — SIGNIFICANT CHANGE UP (ref 0–0.5)
EOSINOPHIL NFR BLD AUTO: 1 % — SIGNIFICANT CHANGE UP (ref 0–6)
HCT VFR BLD CALC: 38.3 % — SIGNIFICANT CHANGE UP (ref 34.5–45)
HGB BLD-MCNC: 12.8 G/DL — SIGNIFICANT CHANGE UP (ref 11.5–15.5)
IMM GRANULOCYTES NFR BLD AUTO: 0.3 % — SIGNIFICANT CHANGE UP (ref 0–0.9)
LYMPHOCYTES # BLD AUTO: 1.41 K/UL — SIGNIFICANT CHANGE UP (ref 1–3.3)
LYMPHOCYTES # BLD AUTO: 24.1 % — SIGNIFICANT CHANGE UP (ref 13–44)
MCHC RBC-ENTMCNC: 33 PG — SIGNIFICANT CHANGE UP (ref 27–34)
MCHC RBC-ENTMCNC: 33.4 G/DL — SIGNIFICANT CHANGE UP (ref 32–36)
MCV RBC AUTO: 98.7 FL — SIGNIFICANT CHANGE UP (ref 80–100)
MONOCYTES # BLD AUTO: 0.33 K/UL — SIGNIFICANT CHANGE UP (ref 0–0.9)
MONOCYTES NFR BLD AUTO: 5.7 % — SIGNIFICANT CHANGE UP (ref 2–14)
NEUTROPHILS # BLD AUTO: 3.98 K/UL — SIGNIFICANT CHANGE UP (ref 1.8–7.4)
NEUTROPHILS NFR BLD AUTO: 68.2 % — SIGNIFICANT CHANGE UP (ref 43–77)
NRBC # BLD: 0 /100 WBCS — SIGNIFICANT CHANGE UP (ref 0–0)
NRBC BLD-RTO: 0 /100 WBCS — SIGNIFICANT CHANGE UP (ref 0–0)
PLATELET # BLD AUTO: 230 K/UL — SIGNIFICANT CHANGE UP (ref 150–400)
RBC # BLD: 3.88 M/UL — SIGNIFICANT CHANGE UP (ref 3.8–5.2)
RBC # FLD: 12.2 % — SIGNIFICANT CHANGE UP (ref 10.3–14.5)
WBC # BLD: 5.84 K/UL — SIGNIFICANT CHANGE UP (ref 3.8–10.5)
WBC # FLD AUTO: 5.84 K/UL — SIGNIFICANT CHANGE UP (ref 3.8–10.5)

## 2025-01-03 PROCEDURE — 85027 COMPLETE CBC AUTOMATED: CPT

## 2025-01-03 PROCEDURE — G2211 COMPLEX E/M VISIT ADD ON: CPT | Mod: NC

## 2025-01-03 PROCEDURE — 99214 OFFICE O/P EST MOD 30 MIN: CPT

## 2025-01-05 LAB
ALBUMIN SERPL ELPH-MCNC: 4.9 G/DL
ALP BLD-CCNC: 72 U/L
ALT SERPL-CCNC: 10 U/L
ANION GAP SERPL CALC-SCNC: 17 MMOL/L
AST SERPL-CCNC: 30 U/L
BILIRUB SERPL-MCNC: 0.6 MG/DL
BUN SERPL-MCNC: 13 MG/DL
CALCIUM SERPL-MCNC: 10.5 MG/DL
CHLORIDE SERPL-SCNC: 96 MMOL/L
CO2 SERPL-SCNC: 23 MMOL/L
CREAT SERPL-MCNC: 0.72 MG/DL
EGFR: 96 ML/MIN/1.73M2
GLUCOSE SERPL-MCNC: 92 MG/DL
POTASSIUM SERPL-SCNC: 4.1 MMOL/L
PROT SERPL-MCNC: 7.4 G/DL
SODIUM SERPL-SCNC: 136 MMOL/L

## 2025-02-26 ENCOUNTER — APPOINTMENT (OUTPATIENT)
Dept: PLASTIC SURGERY | Facility: CLINIC | Age: 60
End: 2025-02-26
Payer: COMMERCIAL

## 2025-02-26 VITALS
TEMPERATURE: 95.4 F | HEART RATE: 76 BPM | OXYGEN SATURATION: 98 % | HEIGHT: 63 IN | DIASTOLIC BLOOD PRESSURE: 80 MMHG | SYSTOLIC BLOOD PRESSURE: 122 MMHG

## 2025-02-26 DIAGNOSIS — Z98.82 BREAST IMPLANT STATUS: ICD-10-CM

## 2025-02-26 DIAGNOSIS — Z85.828 PERSONAL HISTORY OF OTHER MALIGNANT NEOPLASM OF SKIN: ICD-10-CM

## 2025-02-26 DIAGNOSIS — N65.0 DEFORMITY OF RECONSTRUCTED BREAST: ICD-10-CM

## 2025-02-26 DIAGNOSIS — Z90.13 ACQUIRED ABSENCE OF BILATERAL BREASTS AND NIPPLES: ICD-10-CM

## 2025-02-26 PROCEDURE — 99214 OFFICE O/P EST MOD 30 MIN: CPT

## 2025-04-09 ENCOUNTER — OFFICE (OUTPATIENT)
Dept: URBAN - METROPOLITAN AREA CLINIC 97 | Facility: CLINIC | Age: 60
Setting detail: OPHTHALMOLOGY
End: 2025-04-09
Payer: COMMERCIAL

## 2025-04-09 VITALS — HEIGHT: 55 IN

## 2025-04-09 DIAGNOSIS — S05.02XA: ICD-10-CM

## 2025-04-09 PROCEDURE — 99212 OFFICE O/P EST SF 10 MIN: CPT | Performed by: OPTOMETRIST

## 2025-04-09 ASSESSMENT — REFRACTION_MANIFEST
OS_VA1: 20/20
OS_AXIS: 060
OD_VA1: 20/20
OD_VA2: 20/20(J1+)
OD_ADD: +2.50
OS_CYLINDER: SPH
OD_SPHERE: -3.00
OD_VA1: 20/20-
OS_SPHERE: -3.25
OD_AXIS: 030
OS_CYLINDER: +0.25
OS_SPHERE: -4.00
OU_VA: 20/20
OS_VA1: 20/20-2
OS_VA2: 20/20(J1+)
OD_CYLINDER: +0.50
OD_AXIS: 030
OS_ADD: +2.50
OD_SPHERE: -3.75
OD_CYLINDER: +0.25
OU_VA: 20/20-

## 2025-04-09 ASSESSMENT — KERATOMETRY
METHOD_AUTO_MANUAL: AUTO
OD_K2POWER_DIOPTERS: 44.75
OD_AXISANGLE_DEGREES: 051
OS_AXISANGLE_DEGREES: 094
OS_K2POWER_DIOPTERS: 45.25
OS_K1POWER_DIOPTERS: 45.00
OD_K1POWER_DIOPTERS: 44.25

## 2025-04-09 ASSESSMENT — REFRACTION_AUTOREFRACTION
OD_AXIS: 071
OD_CYLINDER: +0.50
OD_SPHERE: -3.25
OS_AXIS: 068
OS_CYLINDER: +0.50
OS_SPHERE: -3.75

## 2025-04-09 ASSESSMENT — REFRACTION_CURRENTRX
OS_OVR_VA: 20/
OS_VPRISM_DIRECTION: PROGS
OD_SPHERE: -4.50
OS_ADD: +2.75
OS_AXIS: 058
OD_OVR_VA: 20/
OD_ADD: +2.75
OS_SPHERE: -4.50
OD_CYLINDER: +0.25
OS_CYLINDER: +0.25
OD_VPRISM_DIRECTION: PROGS
OD_AXIS: 056

## 2025-04-09 ASSESSMENT — CONFRONTATIONAL VISUAL FIELD TEST (CVF)
OS_FINDINGS: FULL
OD_FINDINGS: FULL

## 2025-04-09 ASSESSMENT — VISUAL ACUITY
OS_BCVA: 20/25
OD_BCVA: 20/20

## 2025-05-23 NOTE — PHYSICAL EXAM
[Disease:__________________________] : Disease: [unfilled] [de-identified] : 47 yo F presents to the office today for initial consultation for Anemia. Referred by PCP  . Pt has a PMHx of MI, RA Pt has a PSHx Cholecystectomy, gastric sleeve, Heart Stent  Pt has a FHx of Mother Colon CA, COPD, HTN Father DM Heart Dz, Maternal GFA Leukemia  Social History: socially drinker, non-smoker, , 2 children, Medical Billing   She has been told she was Anemic in the past  She was first told she was anemic in her teenage years She just came for [place] about 1 year ago and is establishing acre with doctors here now  She had taken PO Iron before but is not absorbing because of GASTRIC SLEEVE  She had a BLT before in the past about 3 years ago, with [facility] Infusion in [place]  Menstrual cycle- irregular, she can get it twice a month sometimes, last about 7-11 days, 4-5 days heavy  Medication- None  Allergies- PCN (anaphylactic)  Diet- eats some green vegables, red meats some time     Pt states fatigue, restless leg syndrome, Pica Ice  Pt denies fever, diarrhea, chills, nausea, vomiting, SOB, dyspnea, unintentional weight loss or bleeding. Pt denies palpitations, headache, weakness, dizziness.  Pt has not seen any blood in the stools or melena. No epistaxis, hematemesis or hemoptysis.   Healthcare Maintenace: PCP- Dr. Jose BURK- Dr. Gordon  GI- NP Alfred  Colonoscopy- is due on 3/25/25  Upper Endoscopy- is due tomorrow, had one 10+ years ago  Mammography- 25 WNL  GYN Pelvic Exam/pap- 2025 Breast Exam- 2025, self  LMP- 2025  GPA- A2 (1  1 miscarriage)    Labs on 1/10/25 reviewed and discussed with patient. WBC 11.3, Hgb 11.3, Hct 37.1, , MCV 78 [de-identified] : 5/22/25 47 yo F presents to the office today for follow up for Anemia s/p Venofer 200mg IV weekly x3, last dose on 3/21/25 She has a PSHx of Gastric Bypass  She states she is feeling much better She tolerated well and had no side effects or reactions LMP- 5/14/25, lasted 7 days  She had her thyroid lymph node biopsied.  On 6/16/25 she is going to have a thyroidectomy.  She is taking Blood Builder PO and states she is tolerating and making her feel well  Pt states nasal congestion, restless leg syndrome but gotten much better   Pt denies fever, diarrhea, fatigue, chills, nausea, vomiting, SOB, dyspnea, unintentional weight loss or bleeding. Pt denies palpitations, headache, weakness, dizziness, Pica.  Pt has not seen any blood in the stools or melena. No epistaxis, hematemesis or hemoptysis. Labs on 5/16/25 reviewed and discussed with patient. WBC 11.08, Hgb 13.6, Hct 40.8, , MCV 84.1 Ferritin 123, Iron Serum 25, Iron sat 8, TIBC 296, UIBC 271 [de-identified] : Right medial cheek well healed.\par There is a 2-3 mm area of scar thickening centrally, most consistent with scarring from a deep suture. [de-identified] : NL respiratory effort noted  [de-identified] : Bilateral recon breasts slightly softer.\par Left upper pole improved, but the lower pole is still stiff with thin coverage.\par The central aspect is softer and the skin is more mobile.

## 2025-06-25 ENCOUNTER — APPOINTMENT (OUTPATIENT)
Facility: CLINIC | Age: 60
End: 2025-06-25

## 2025-07-03 ENCOUNTER — OUTPATIENT (OUTPATIENT)
Dept: OUTPATIENT SERVICES | Facility: HOSPITAL | Age: 60
LOS: 1 days | End: 2025-07-03
Payer: COMMERCIAL

## 2025-07-03 DIAGNOSIS — C50.919 MALIGNANT NEOPLASM OF UNSPECIFIED SITE OF UNSPECIFIED FEMALE BREAST: ICD-10-CM

## 2025-07-09 ENCOUNTER — RESULT REVIEW (OUTPATIENT)
Age: 60
End: 2025-07-09

## 2025-07-09 ENCOUNTER — APPOINTMENT (OUTPATIENT)
Dept: HEMATOLOGY ONCOLOGY | Facility: CLINIC | Age: 60
End: 2025-07-09

## 2025-07-09 ENCOUNTER — LABORATORY RESULT (OUTPATIENT)
Age: 60
End: 2025-07-09

## 2025-07-09 VITALS
SYSTOLIC BLOOD PRESSURE: 155 MMHG | TEMPERATURE: 98.1 F | DIASTOLIC BLOOD PRESSURE: 81 MMHG | OXYGEN SATURATION: 99 % | WEIGHT: 128 LBS | HEART RATE: 60 BPM | BODY MASS INDEX: 22.68 KG/M2 | HEIGHT: 63 IN

## 2025-07-09 PROBLEM — D53.9 MACROCYTIC ANEMIA: Status: ACTIVE | Noted: 2025-07-09

## 2025-07-09 LAB
BASOPHILS # BLD AUTO: 0.03 K/UL — SIGNIFICANT CHANGE UP (ref 0–0.2)
BASOPHILS NFR BLD AUTO: 0.6 % — SIGNIFICANT CHANGE UP (ref 0–2)
EOSINOPHIL # BLD AUTO: 0.06 K/UL — SIGNIFICANT CHANGE UP (ref 0–0.5)
EOSINOPHIL NFR BLD AUTO: 1.3 % — SIGNIFICANT CHANGE UP (ref 0–6)
HCT VFR BLD CALC: 34.5 % — SIGNIFICANT CHANGE UP (ref 34.5–45)
HGB BLD-MCNC: 11.4 G/DL — LOW (ref 11.5–15.5)
IMM GRANULOCYTES # BLD AUTO: 0.01 K/UL — SIGNIFICANT CHANGE UP (ref 0–0.07)
IMM GRANULOCYTES NFR BLD AUTO: 0.2 % — SIGNIFICANT CHANGE UP (ref 0–0.9)
LYMPHOCYTES # BLD AUTO: 1.37 K/UL — SIGNIFICANT CHANGE UP (ref 1–3.3)
LYMPHOCYTES NFR BLD AUTO: 29.1 % — SIGNIFICANT CHANGE UP (ref 13–44)
MCHC RBC-ENTMCNC: 33 G/DL — SIGNIFICANT CHANGE UP (ref 32–36)
MCHC RBC-ENTMCNC: 33.5 PG — SIGNIFICANT CHANGE UP (ref 27–34)
MCV RBC AUTO: 101.5 FL — HIGH (ref 80–100)
MONOCYTES # BLD AUTO: 0.34 K/UL — SIGNIFICANT CHANGE UP (ref 0–0.9)
MONOCYTES NFR BLD AUTO: 7.2 % — SIGNIFICANT CHANGE UP (ref 2–14)
NEUTROPHILS # BLD AUTO: 2.9 K/UL — SIGNIFICANT CHANGE UP (ref 1.8–7.4)
NEUTROPHILS NFR BLD AUTO: 61.6 % — SIGNIFICANT CHANGE UP (ref 43–77)
NRBC # BLD AUTO: 0 K/UL — SIGNIFICANT CHANGE UP (ref 0–0)
NRBC # FLD: 0 K/UL — SIGNIFICANT CHANGE UP (ref 0–0)
NRBC BLD AUTO-RTO: 0 /100 WBCS — SIGNIFICANT CHANGE UP (ref 0–0)
PLATELET # BLD AUTO: 218 K/UL — SIGNIFICANT CHANGE UP (ref 150–400)
PMV BLD: 11.5 FL — SIGNIFICANT CHANGE UP (ref 7–13)
RBC # BLD: 3.4 M/UL — LOW (ref 3.8–5.2)
RBC # FLD: 12.7 % — SIGNIFICANT CHANGE UP (ref 10.3–14.5)
WBC # BLD: 4.71 K/UL — SIGNIFICANT CHANGE UP (ref 3.8–10.5)
WBC # FLD AUTO: 4.71 K/UL — SIGNIFICANT CHANGE UP (ref 3.8–10.5)

## 2025-07-09 PROCEDURE — 85025 COMPLETE CBC W/AUTO DIFF WBC: CPT

## 2025-07-10 LAB
ALBUMIN SERPL ELPH-MCNC: 4.7 G/DL
ALP BLD-CCNC: 91 U/L
ALT SERPL-CCNC: 9 U/L
ANION GAP SERPL CALC-SCNC: 16 MMOL/L
AST SERPL-CCNC: 26 U/L
BILIRUB SERPL-MCNC: 0.3 MG/DL
BUN SERPL-MCNC: 15 MG/DL
CALCIUM SERPL-MCNC: 10.1 MG/DL
CHLORIDE SERPL-SCNC: 102 MMOL/L
CO2 SERPL-SCNC: 21 MMOL/L
CREAT SERPL-MCNC: 0.64 MG/DL
EGFRCR SERPLBLD CKD-EPI 2021: 101 ML/MIN/1.73M2
GLUCOSE SERPL-MCNC: 92 MG/DL
POTASSIUM SERPL-SCNC: 4.3 MMOL/L
PROT SERPL-MCNC: 7.2 G/DL
SODIUM SERPL-SCNC: 139 MMOL/L

## 2025-08-06 ENCOUNTER — APPOINTMENT (OUTPATIENT)
Dept: INFUSION THERAPY | Facility: CANCER CENTER | Age: 60
End: 2025-08-06

## 2025-08-06 PROCEDURE — 85025 COMPLETE CBC W/AUTO DIFF WBC: CPT

## 2025-08-06 PROCEDURE — 96372 THER/PROPH/DIAG INJ SC/IM: CPT

## 2025-08-13 ENCOUNTER — APPOINTMENT (OUTPATIENT)
Dept: INFUSION THERAPY | Facility: CANCER CENTER | Age: 60
End: 2025-08-13

## 2025-08-13 PROCEDURE — 85025 COMPLETE CBC W/AUTO DIFF WBC: CPT

## 2025-08-13 PROCEDURE — 96372 THER/PROPH/DIAG INJ SC/IM: CPT

## 2025-08-14 DIAGNOSIS — E53.8 DEFICIENCY OF OTHER SPECIFIED B GROUP VITAMINS: ICD-10-CM

## 2025-08-20 ENCOUNTER — APPOINTMENT (OUTPATIENT)
Dept: INFUSION THERAPY | Facility: CANCER CENTER | Age: 60
End: 2025-08-20

## 2025-08-20 PROCEDURE — 96372 THER/PROPH/DIAG INJ SC/IM: CPT

## 2025-08-20 PROCEDURE — 85025 COMPLETE CBC W/AUTO DIFF WBC: CPT

## 2025-08-27 ENCOUNTER — APPOINTMENT (OUTPATIENT)
Dept: INFUSION THERAPY | Facility: CANCER CENTER | Age: 60
End: 2025-08-27

## 2025-08-27 PROCEDURE — 96372 THER/PROPH/DIAG INJ SC/IM: CPT

## 2025-08-27 PROCEDURE — 85025 COMPLETE CBC W/AUTO DIFF WBC: CPT
